# Patient Record
Sex: MALE | Race: WHITE | NOT HISPANIC OR LATINO | Employment: OTHER | URBAN - METROPOLITAN AREA
[De-identification: names, ages, dates, MRNs, and addresses within clinical notes are randomized per-mention and may not be internally consistent; named-entity substitution may affect disease eponyms.]

---

## 2017-08-04 ENCOUNTER — APPOINTMENT (OUTPATIENT)
Dept: RADIOLOGY | Facility: MEDICAL CENTER | Age: 62
End: 2017-08-04
Attending: EMERGENCY MEDICINE
Payer: MEDICARE

## 2017-08-04 ENCOUNTER — HOSPITAL ENCOUNTER (EMERGENCY)
Facility: MEDICAL CENTER | Age: 62
End: 2017-08-04
Attending: EMERGENCY MEDICINE
Payer: MEDICARE

## 2017-08-04 VITALS
HEART RATE: 75 BPM | DIASTOLIC BLOOD PRESSURE: 80 MMHG | RESPIRATION RATE: 17 BRPM | WEIGHT: 153 LBS | SYSTOLIC BLOOD PRESSURE: 110 MMHG | HEIGHT: 72 IN | OXYGEN SATURATION: 96 % | TEMPERATURE: 98.4 F | BODY MASS INDEX: 20.72 KG/M2

## 2017-08-04 DIAGNOSIS — S00.03XA SCALP CONTUSION: ICD-10-CM
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
## 2017-08-04 DIAGNOSIS — S09.90XA CHI (CLOSED HEAD INJURY), INITIAL ENCOUNTER: ICD-10-CM

## 2017-08-04 PROCEDURE — 99284 EMERGENCY DEPT VISIT MOD MDM: CPT

## 2017-08-04 PROCEDURE — 70450 CT HEAD/BRAIN W/O DYE: CPT

## 2017-08-04 RX ORDER — TAMSULOSIN HYDROCHLORIDE 0.4 MG/1
0.4 CAPSULE ORAL
COMMUNITY

## 2017-08-04 RX ORDER — ACETAMINOPHEN 160 MG
1 TABLET,DISINTEGRATING ORAL DAILY
COMMUNITY

## 2017-08-04 ASSESSMENT — PAIN SCALES - WONG BAKER
WONGBAKER_NUMERICALRESPONSE: HURTS JUST A LITTLE BIT

## 2017-08-04 NOTE — DISCHARGE INSTRUCTIONS
Fall Prevention in the Home   Falls can cause injuries. They can happen to people of all ages. There are many things you can do to make your home safe and to help prevent falls.   WHAT CAN I DO ON THE OUTSIDE OF MY HOME?  · Regularly fix the edges of walkways and driveways and fix any cracks.  · Remove anything that might make you trip as you walk through a door, such as a raised step or threshold.  · Trim any bushes or trees on the path to your home.  · Use bright outdoor lighting.  · Clear any walking paths of anything that might make someone trip, such as rocks or tools.  · Regularly check to see if handrails are loose or broken. Make sure that both sides of any steps have handrails.  · Any raised decks and porches should have guardrails on the edges.  · Have any leaves, snow, or ice cleared regularly.  · Use sand or salt on walking paths during winter.  · Clean up any spills in your garage right away. This includes oil or grease spills.  WHAT CAN I DO IN THE BATHROOM?   · Use night lights.  · Install grab bars by the toilet and in the tub and shower. Do not use towel bars as grab bars.  · Use non-skid mats or decals in the tub or shower.  · If you need to sit down in the shower, use a plastic, non-slip stool.  · Keep the floor dry. Clean up any water that spills on the floor as soon as it happens.  · Remove soap buildup in the tub or shower regularly.  · Attach bath mats securely with double-sided non-slip rug tape.  · Do not have throw rugs and other things on the floor that can make you trip.  WHAT CAN I DO IN THE BEDROOM?  · Use night lights.  · Make sure that you have a light by your bed that is easy to reach.  · Do not use any sheets or blankets that are too big for your bed. They should not hang down onto the floor.  · Have a firm chair that has side arms. You can use this for support while you get dressed.  · Do not have throw rugs and other things on the floor that can make you trip.  WHAT CAN I DO IN  THE KITCHEN?  · Clean up any spills right away.  · Avoid walking on wet floors.  · Keep items that you use a lot in easy-to-reach places.  · If you need to reach something above you, use a strong step stool that has a grab bar.  · Keep electrical cords out of the way.  · Do not use floor polish or wax that makes floors slippery. If you must use wax, use non-skid floor wax.  · Do not have throw rugs and other things on the floor that can make you trip.  WHAT CAN I DO WITH MY STAIRS?  · Do not leave any items on the stairs.  · Make sure that there are handrails on both sides of the stairs and use them. Fix handrails that are broken or loose. Make sure that handrails are as long as the stairways.  · Check any carpeting to make sure that it is firmly attached to the stairs. Fix any carpet that is loose or worn.  · Avoid having throw rugs at the top or bottom of the stairs. If you do have throw rugs, attach them to the floor with carpet tape.  · Make sure that you have a light switch at the top of the stairs and the bottom of the stairs. If you do not have them, ask someone to add them for you.  WHAT ELSE CAN I DO TO HELP PREVENT FALLS?  · Wear shoes that:  ¨ Do not have high heels.  ¨ Have rubber bottoms.  ¨ Are comfortable and fit you well.  ¨ Are closed at the toe. Do not wear sandals.  · If you use a stepladder:  ¨ Make sure that it is fully opened. Do not climb a closed stepladder.  ¨ Make sure that both sides of the stepladder are locked into place.  ¨ Ask someone to hold it for you, if possible.  · Clearly lucian and make sure that you can see:  ¨ Any grab bars or handrails.  ¨ First and last steps.  ¨ Where the edge of each step is.  · Use tools that help you move around (mobility aids) if they are needed. These include:  ¨ Canes.  ¨ Walkers.  ¨ Scooters.  ¨ Crutches.  · Turn on the lights when you go into a dark area. Replace any light bulbs as soon as they burn out.  · Set up your furniture so you have a clear  path. Avoid moving your furniture around.  · If any of your floors are uneven, fix them.  · If there are any pets around you, be aware of where they are.  · Review your medicines with your doctor. Some medicines can make you feel dizzy. This can increase your chance of falling.  Ask your doctor what other things that you can do to help prevent falls.     This information is not intended to replace advice given to you by your health care provider. Make sure you discuss any questions you have with your health care provider.     Document Released: 10/14/2010 Document Revised: 05/03/2016 Document Reviewed: 01/22/2016  [employer] Interactive Patient Education ©2016 [employer]    Head Injury, Adult  You have a head injury. Headaches and throwing up (vomiting) are common after a head injury. It should be easy to wake up from sleeping. Sometimes you must stay in the hospital. Most problems happen within the first 24 hours. Side effects may occur up to 7-10 days after the injury.   WHAT ARE THE TYPES OF HEAD INJURIES?  Head injuries can be as minor as a bump. Some head injuries can be more severe. More severe head injuries include:  · A jarring injury to the brain (concussion).  · A bruise of the brain (contusion). This mean there is bleeding in the brain that can cause swelling.  · A cracked skull (skull fracture).  · Bleeding in the brain that collects, clots, and forms a bump (hematoma).  WHEN SHOULD I GET HELP RIGHT AWAY?   · You are confused or sleepy.  · You cannot be woken up.  · You feel sick to your stomach (nauseous) or keep throwing up (vomiting).  · Your dizziness or unsteadiness is getting worse.  · You have very bad, lasting headaches that are not helped by medicine. Take medicines only as told by your doctor.  · You cannot use your arms or legs like normal.  · You cannot walk.  · You notice changes in the black spots in the center of the colored part of your eye (pupil).  · You have clear or bloody fluid  coming from your nose or ears.  · You have trouble seeing.  During the next 24 hours after the injury, you must stay with someone who can watch you. This person should get help right away (call 911 in the [place] if you start to shake and are not able to control it (have seizures), you pass out, or you are unable to wake up.  HOW CAN I PREVENT A HEAD INJURY IN THE FUTURE?  · Wear seat belts.  · Wear a helmet while bike riding and playing sports like football.  · Stay away from dangerous activities around the house.  WHEN CAN I RETURN TO NORMAL ACTIVITIES AND ATHLETICS?  See your doctor before doing these activities. You should not do normal activities or play contact sports until 1 week after the following symptoms have stopped:  · Headache that does not go away.  · Dizziness.  · Poor attention.  · Confusion.  · Memory problems.  · Sickness to your stomach or throwing up.  · Tiredness.  · Fussiness.  · Bothered by bright lights or loud noises.  · Anxiousness or depression.  · Restless sleep.  MAKE SURE YOU:   · Understand these instructions.  · Will watch your condition.  · Will get help right away if you are not doing well or get worse.     This information is not intended to replace advice given to you by your health care provider. Make sure you discuss any questions you have with your health care provider.     Document Released: 11/30/2009 Document Revised: 01/08/2016 Document Reviewed: 08/25/2014  [employer] Interactive Patient Education ©2016 [employer] Inc.

## 2017-08-04 NOTE — ED AVS SNAPSHOT
Home Care Instructions                                                                                                                Mauricio Santizo   MRN: [id number]    Department:  [facility], Emergency Dept   Date of Visit:  8/4/2017            [facility], Emergency Dept    [address] [facility] [address]    Phone:  [phone]      You were seen by     1. Darius Tiwari M.D.    2. Krystal Godwin M.D.      Your Diagnosis Was     Fall, initial encounter     W19.XXXA       Follow-up Information     1. Follow up with [facility], Emergency Dept.    Specialty:  Emergency Medicine    Why:  As needed, If symptoms worsen    Contact information    [address] [place] [id number] [phone]      Medication Information     Review all of your home medications and newly ordered medications with your primary doctor and/or pharmacist as soon as possible. Follow medication instructions as directed by your doctor and/or pharmacist.     Please keep your complete medication list with you and share with your physician. Update the information when medications are discontinued, doses are changed, or new medications (including over-the-counter products) are added; and carry medication information at all times in the event of emergency situations.               Medication List      ASK your doctor about these medications        Instructions    Morning Afternoon Evening Bedtime    tamsulosin 0.4 MG capsule   Commonly known as:  FLOMAX        Take 0.4 mg by mouth ONE-HALF HOUR AFTER BREAKFAST.   Dose:  0.4 mg                        Vitamin D3 2000 UNIT Caps        Take  by mouth.                                Procedures and tests performed during your visit     CT-HEAD W/O        Discharge Instructions       Fall Prevention in the Home   Falls can cause injuries. They can happen to people of all ages. There are many things you can do to make your home safe and  to help prevent falls.   WHAT CAN I DO ON THE OUTSIDE OF MY HOME?  · Regularly fix the edges of walkways and driveways and fix any cracks.  · Remove anything that might make you trip as you walk through a door, such as a raised step or threshold.  · Trim any bushes or trees on the path to your home.  · Use bright outdoor lighting.  · Clear any walking paths of anything that might make someone trip, such as rocks or tools.  · Regularly check to see if handrails are loose or broken. Make sure that both sides of any steps have handrails.  · Any raised decks and porches should have guardrails on the edges.  · Have any leaves, snow, or ice cleared regularly.  · Use sand or salt on walking paths during winter.  · Clean up any spills in your garage right away. This includes oil or grease spills.  WHAT CAN I DO IN THE BATHROOM?   · Use night lights.  · Install grab bars by the toilet and in the tub and shower. Do not use towel bars as grab bars.  · Use non-skid mats or decals in the tub or shower.  · If you need to sit down in the shower, use a plastic, non-slip stool.  · Keep the floor dry. Clean up any water that spills on the floor as soon as it happens.  · Remove soap buildup in the tub or shower regularly.  · Attach bath mats securely with double-sided non-slip rug tape.  · Do not have throw rugs and other things on the floor that can make you trip.  WHAT CAN I DO IN THE BEDROOM?  · Use night lights.  · Make sure that you have a light by your bed that is easy to reach.  · Do not use any sheets or blankets that are too big for your bed. They should not hang down onto the floor.  · Have a firm chair that has side arms. You can use this for support while you get dressed.  · Do not have throw rugs and other things on the floor that can make you trip.  WHAT CAN I DO IN THE KITCHEN?  · Clean up any spills right away.  · Avoid walking on wet floors.  · Keep items that you use a lot in easy-to-reach places.  · If you need to  reach something above you, use a strong step stool that has a grab bar.  · Keep electrical cords out of the way.  · Do not use floor polish or wax that makes floors slippery. If you must use wax, use non-skid floor wax.  · Do not have throw rugs and other things on the floor that can make you trip.  WHAT CAN I DO WITH MY STAIRS?  · Do not leave any items on the stairs.  · Make sure that there are handrails on both sides of the stairs and use them. Fix handrails that are broken or loose. Make sure that handrails are as long as the stairways.  · Check any carpeting to make sure that it is firmly attached to the stairs. Fix any carpet that is loose or worn.  · Avoid having throw rugs at the top or bottom of the stairs. If you do have throw rugs, attach them to the floor with carpet tape.  · Make sure that you have a light switch at the top of the stairs and the bottom of the stairs. If you do not have them, ask someone to add them for you.  WHAT ELSE CAN I DO TO HELP PREVENT FALLS?  · Wear shoes that:  ¨ Do not have high heels.  ¨ Have rubber bottoms.  ¨ Are comfortable and fit you well.  ¨ Are closed at the toe. Do not wear sandals.  · If you use a stepladder:  ¨ Make sure that it is fully opened. Do not climb a closed stepladder.  ¨ Make sure that both sides of the stepladder are locked into place.  ¨ Ask someone to hold it for you, if possible.  · Clearly lucian and make sure that you can see:  ¨ Any grab bars or handrails.  ¨ First and last steps.  ¨ Where the edge of each step is.  · Use tools that help you move around (mobility aids) if they are needed. These include:  ¨ Canes.  ¨ Walkers.  ¨ Scooters.  ¨ Crutches.  · Turn on the lights when you go into a dark area. Replace any light bulbs as soon as they burn out.  · Set up your furniture so you have a clear path. Avoid moving your furniture around.  · If any of your floors are uneven, fix them.  · If there are any pets around you, be aware of where they  are.  · Review your medicines with your doctor. Some medicines can make you feel dizzy. This can increase your chance of falling.  Ask your doctor what other things that you can do to help prevent falls.     This information is not intended to replace advice given to you by your health care provider. Make sure you discuss any questions you have with your health care provider.     Document Released: 10/14/2010 Document Revised: 05/03/2016 Document Reviewed: 01/22/2016  [employer] Interactive Patient Education ©2016 [employer] Inc.    Head Injury, Adult  You have a head injury. Headaches and throwing up (vomiting) are common after a head injury. It should be easy to wake up from sleeping. Sometimes you must stay in the hospital. Most problems happen within the first 24 hours. Side effects may occur up to 7-10 days after the injury.   WHAT ARE THE TYPES OF HEAD INJURIES?  Head injuries can be as minor as a bump. Some head injuries can be more severe. More severe head injuries include:  · A jarring injury to the brain (concussion).  · A bruise of the brain (contusion). This mean there is bleeding in the brain that can cause swelling.  · A cracked skull (skull fracture).  · Bleeding in the brain that collects, clots, and forms a bump (hematoma).  WHEN SHOULD I GET HELP RIGHT AWAY?   · You are confused or sleepy.  · You cannot be woken up.  · You feel sick to your stomach (nauseous) or keep throwing up (vomiting).  · Your dizziness or unsteadiness is getting worse.  · You have very bad, lasting headaches that are not helped by medicine. Take medicines only as told by your doctor.  · You cannot use your arms or legs like normal.  · You cannot walk.  · You notice changes in the black spots in the center of the colored part of your eye (pupil).  · You have clear or bloody fluid coming from your nose or ears.  · You have trouble seeing.  During the next 24 hours after the injury, you must stay with someone who can watch you. This  person should get help right away (call 911 in the [place] if you start to shake and are not able to control it (have seizures), you pass out, or you are unable to wake up.  HOW CAN I PREVENT A HEAD INJURY IN THE FUTURE?  · Wear seat belts.  · Wear a helmet while bike riding and playing sports like football.  · Stay away from dangerous activities around the house.  WHEN CAN I RETURN TO NORMAL ACTIVITIES AND ATHLETICS?  See your doctor before doing these activities. You should not do normal activities or play contact sports until 1 week after the following symptoms have stopped:  · Headache that does not go away.  · Dizziness.  · Poor attention.  · Confusion.  · Memory problems.  · Sickness to your stomach or throwing up.  · Tiredness.  · Fussiness.  · Bothered by bright lights or loud noises.  · Anxiousness or depression.  · Restless sleep.  MAKE SURE YOU:   · Understand these instructions.  · Will watch your condition.  · Will get help right away if you are not doing well or get worse.     This information is not intended to replace advice given to you by your health care provider. Make sure you discuss any questions you have with your health care provider.     Document Released: 11/30/2009 Document Revised: 01/08/2016 Document Reviewed: 08/25/2014  [employer] Interactive Patient Education ©2016 [employer]            Patient Information     Patient Information    Following emergency treatment: all patient requiring follow-up care must return either to a private physician or a clinic if your condition worsens before you are able to obtain further medical attention, please return to the emergency room.     Billing Information    At [facility], we work to make the billing process streamlined for our patients.  Our Representatives are here to answer any questions you may have regarding your hospital bill.  If you have insurance coverage and have supplied your insurance information to us, we will submit a claim to  your insurer on your behalf.  Should you have any questions regarding your bill, we can be reached online or by phone as follows:  Online: You are able pay your bills online or live chat with our representatives about any billing questions you may have. We are here to help Monday - Friday from 8:00am to 7:30pm and 9:00am - 12:00pm on Saturdays.  Please visit [email or web address]  for more information.   Phone:  [phone] or [phone]    Please note that your emergency physician, surgeon, pathologist, radiologist, anesthesiologist, and other specialists are not employed by [facility] and will therefore bill separately for their services.  Please contact them directly for any questions concerning their bills at the numbers below:     Emergency Physician Services:  [phone]  [facility]:  [phone]  [facility]:  [phone]  [facility]:  [phone]    1. Your final bill may vary from the amount quoted upon discharge if all procedures are not complete at that time, or if your doctor has additional procedures of which we are not aware. You will receive an additional bill if you return to the Emergency Department at [facility] for suture removal regardless of the facility of which the sutures were placed.     2. Please arrange for settlement of this account at the emergency registration.    3. All self-pay accounts are due in full at the time of treatment.  If you are unable to meet this obligation then payment is expected within 4-5 days.     4. If you have had radiology studies (CT, X-ray, Ultrasound, MRI), you have received a preliminary result during your emergency department visit. Please contact the radiology department [phone] to receive a copy of your final result. Please discuss the Final result with your primary physician or with the follow up physician provided.     Crisis Hotline:  National Crisis  Hotline:  8-342-PDBRXGF or 1-667.196.6516  [place] Crisis Hotline:    1-395.687.7313 or 660-924-8456         ED Discharge Follow Up Questions    1. In order to provide you with very good care, we would like to follow up with a phone call in the next few days.  May we have your permission to contact you?     YES /  NO    2. What is the best phone number to call you? (       )_____-__________    3. What is the best time to call you?      Morning  /  Afternoon  /  Evening                   Patient Signature:  ____________________________________________________________    Date:  ____________________________________________________________

## 2017-08-04 NOTE — ED PROVIDER NOTES
ED Provider Note    Scribed for Darius Tiwari M.D. by Justine Shearer. 8/4/2017  11:55 AM    Primary Care Provider: None  Means of arrival: Ambulance  History obtained from: Patient  History limited by: None    CHIEF COMPLAINT  Chief Complaint   Patient presents with   • Fall and Head Injury       HPI  Mauricio Santizo is a 62 y.o. male who presents to the ED by ambulance for a fall and a head injury with an onset of today. Patient is a resident at [facility] for a history of a traumatic brain injury. He experienced a ground level fall today while attempting to get out of his wheelchair. He struck the left side of his head on a nightstand. He presents with an abrasion to his left temporal. Xarelto was discontinued yesterday. Per nursing staff, mentation is at baseline. Negative for loss of consciousness, chest pain, shortness of breath, headache or vision changes.      REVIEW OF SYSTEMS  CONSTITUTIONAL:  Denies fever, chills, weight gain/loss or weakness.  EYES:  Denies vision changes.  ENT:  Denies sore throat, nose or ear pain.  CARDIOVASCULAR:  Denies chest pain, palpitations or swelling.  RESPIRATORY:  Denies cough, shortness of breath or difficulty breathing.  GI:  Denies abdominal pain, nausea, vomiting or diarrhea.  MUSCULOSKELETAL:  Positive ground level fall. Denies weakness, joint swelling or back pain.  SKIN:  Positive abrasion to left temporal. No rash or bruising.  NEUROLOGIC:  Positive head injury to left. Denies loss of consciousness, headache, focal weakness or numbness.  PSYCHIATRIC:  Denies depression.    See [facility] [place] for further details. C.      PAST MEDICAL HISTORY  Past Medical History   Diagnosis Date   • Failure to thrive (0-17)    • Seizure disorder (CMS-[facility])    • Dementia    • High cholesterol    • TBI (traumatic brain injury) (CMS-[facility])    • DVT (deep venous thrombosis) (CMS-[facility])    • BPH (benign prostatic hyperplasia)    • Constipation        FAMILY HISTORY  History reviewed. No pertinent family  "history.      SOCIAL HISTORY  Patient reports that he has never smoked.  He reports that he does not drink alcohol or use illicit drugs.      SURGICAL HISTORY  History reviewed. No pertinent past surgical history.      CURRENT MEDICATIONS  Xarelto was discontinued yesterday.  No current facility-administered medications for this encounter.    Current outpatient prescriptions:   •  tamsulosin (FLOMAX) 0.4 MG capsule, Take 0.4 mg by mouth ONE-HALF HOUR AFTER BREAKFAST., Disp: , Rfl:   •  Cholecalciferol (VITAMIN D3) 2000 UNIT Cap, Take  by mouth., Disp: , Rfl:       ALLERGIES  Allergies   Allergen Reactions   • Depakote [Valproic Acid]    • Heparin    • Phenytoin        PHYSICAL EXAM  VITAL SIGNS: /80 mmHg  Pulse 79  Temp(Src) 36.9 °C (98.4 °F)  Resp 16  Ht 1.829 m (6' 0.01\")  Wt 69.4 kg (153 lb)  BMI 20.75 kg/m2       Constitutional: Patient is awake and alert. No acute respiratory distress.elderly male.   HENT: Normocephalic, Abrasion to left frontal temporal area, No prabhakar sign or racoon eyes, Bilateral external ears normal, Oropharynx pink moist with no exudates, Nose patent.  Eyes: Sclera and conjunctiva clear, No discharge.   Neck:  Supple no nuchal rigidity, no thyromegaly or mass. Non-tender  Lymphatic: No supraclavicular lymph nodes.   Cardiovascular: Heart is regular rate and rhythm no murmur, rub or thrill.   Thorax & Lungs: Chest is symmetrical, with good breath sounds. No wheezing or crackles. No respiratory distress, No chest tenderness.   Abdomen: Soft, No tenderness no hepatosplenomegaly there is no guarding or rebound, No masses, No pulsatile masses. Bowel sounds are present.  Skin: Warm, Dry, no petechia, purpura, or rash.   Back: Non tender with palpation, No CVA tenderness.   Extremities: Thin extremities, No edema. Non tender.   Musculoskeletal: Able to move arms symmetricallybut are weak, Pulses 2+ radially and femorally. No gross deformities noted.   Neurologic: Alert.  No movement " to legs. Able to move arms symmetricallybut are weak, Sensory is intact to light touch to face, arms, and legs.  DTRs are symmetrical in biceps brachioradialis, patella and Achilles.   Psychiatric: Normal affect.          RADIOLOGY/PROCEDURES  CT-HEAD W/O    (Results Pending)     The radiologist's interpretations of all radiological studies have been reviewed by me.       COURSE & MEDICAL DECISION MAKING  Pertinent Labs & Imaging studies reviewed. (See chart for details)    Differential Diagnosis include but are not limited to: intracranial hemorrhage, contusion, stroke.    11:56 AM Patient seen and examined at bedside. Patient presents for a head injury. Initial orders in the Emergency Department included CT head.     1440. Patient still waiting for his CAT scan.            Decision Making:  Patient who had a fall today bumped his left head. He has an abrasion there. However CAT scan on his head. He is still not been done due the only trauma that is come to earlier. He is currently not on any blood thinners per the nursing home. On recheck he still wide awake. If his CAT scan is normal will transfer him back to the care facility.    We'll turn the patient over to my partner for follow-up on the CAT scan.    DISPOSITION  Patient will be discharged home in stable condition.      FOLLOW UP  I  care doctor or the care facility    The patient is referred to a primary physician for blood pressure management, diabetic screening, and for all other preventative health concerns.      OUTPATIENT MEDICATIONS  New Prescriptions    No medications on file       DIAGNOSIS  1. Fall  2. Left forehead contusion       PLAN  1. CAT scan negative will go back to the rehab hospital  2. Return to the emergency department for increased pains, fevers, vomiting or change in condition.        The note accurately reflects work and decisions made by me.  Darius Tiwari  8/4/2017  2:41 PM     IJustine (Scribe), am scribing for,  and in the presence of, Darius Tiwari M.D.    Electronically signed by: Justine Shearer (Scribe), 8/4/2017    I, Darius Tiwari M.D. personally performed the services described in this documentation, as scribed by Justine Shearer in my presence, and it is both accurate and complete.

## 2017-08-04 NOTE — ED AVS SNAPSHOT
8/4/2017    Mauricio Santizo  [address]  [place] [address]    Dear Mauricio:    [facility] wants to ensure your discharge home is safe and you or your loved ones have had all of your questions answered regarding your care after you leave the hospital.    Below is a list of resources and contact information should you have any questions regarding your hospital stay, follow-up instructions, or active medical symptoms.    Questions or Concerns Regarding… Contact   Medical Questions Related to Your Discharge  (7 days a week, 8am-5pm) Contact a Nurse Care Coordinator   [phone]   Medical Questions Not Related to Your Discharge  (24 hours a day / 7 days a week)  Contact the Nurse Health Line   [phone]    Medications or Discharge Instructions Refer to your discharge packet   or contact your [facility] Primary Care Provider   [phone]   Follow-up Appointment(s) Schedule your appointment via Blurb   or contact Scheduling [phone]   Billing Review your statement via Blurb  or contact Billing [phone]   Medical Records Review your records via Blurb   or contact Medical Records [phone]     You may receive a telephone call within two days of discharge. This call is to make certain you understand your discharge instructions and have the opportunity to have any questions answered. You can also easily access your medical information, test results and upcoming appointments via the Blurb free online health management tool. You can learn more and sign up at [email or web address]. For assistance setting up your Blurb account, please call [phone].    Once again, we want to ensure your discharge home is safe and that you have a clear understanding of any next steps in your care. If you have any questions or concerns, please do not hesitate to contact us, we are here for you. Thank you for choosing [facility] for your healthcare needs.    Sincerely,    Your [facility] Healthcare Team

## 2017-08-04 NOTE — DISCHARGE PLANNING
Medical Social Work     SW received a call from [facility] and transportation is set up for 1730. Transportation packet and Cobra complete and placed with hard chart. Pt, RN and family notified.

## 2017-08-04 NOTE — ED PROVIDER NOTES
ED PROVIDER NOTE    Scribed for Krystal Godwin M.D. by Nelly Trejo. 8/4/2017, 3:33 PM.    This is an addendum to the note on Mauricio Santizo. For further details and full chart entry, see the previously signed ED Provider Note written by Dr. Tiwari ([facility]).      3:33 PM The patient's CT head was reviewed and found to be negative. Patient reevaluated at bedside and informed of CT head results. Discussed arrangement to go back to [facility]. He is agreeable.       Nelly MALCOLM (Scribselam), am scribing for, and in the presence of, Krystal Godwin M.D..    Electronically signed by: Nelly Trejo ([place]), 8/4/2017    Krystal MALCOLM M.D. personally performed the services described in this documentation, as scribed by Nelly Trejo in my presence, and it is both accurate and complete.    The note accurately reflects work and decisions made by me.  Krystal Godwin  8/4/2017  8:43 PM

## 2017-08-04 NOTE — DISCHARGE PLANNING
Arranged patient's transport back to [facility] at 1730 veia [facility].  Tiera() and Casey([facility]) notified.

## 2017-08-04 NOTE — ED NOTES
Pt arrived via JAYA, resident at [facility] for hx of TBI and failure to thrive. Per EMS pt has had 3 falls within the past week with last one occuring today. EMS states pt has been attempting to get out of wheelchair recently and per staff at [facility] he possibly fell out of wheelchair today hitting L side of head on nightstand. Pt was d/c from Xarelto yesterday. Pt has abrasion and swelling noted to L side of head. No open wound. Pt mentation is at baseline according to EMS, alert to self and place only. Pt also has uneven pupils d/t prior TBI. Pt follows commands and answers most questions appropriately. .

## 2017-08-04 NOTE — ED NOTES
Pt states he had a BM. Pt changed, small amount of hard stool noted in brief. Pt changed into new brief. No skin breakdown noted.

## 2017-08-04 NOTE — DISCHARGE PLANNING
Medical Social Work     Referral: Transportation to SNF    MICHOACANO received a call from beside RN requesting assistance with transportation to SNF. Pt is medically clear to go back to [facility].  MICHOACANO faxed over a [facility] form to the [facility].

## 2017-08-05 NOTE — ED NOTES
MD at bedside prior to d/c. Pt given d/c instruction. Unable to sign. No rx's given. Pt taken back to [facility] via [facility]. Pt took all belongings from room.

## 2018-02-16 ENCOUNTER — RESOLUTE PROFESSIONAL BILLING HOSPITAL PROF FEE (OUTPATIENT)
Dept: HOSPITALIST | Facility: MEDICAL CENTER | Age: 63
End: 2018-02-16
Payer: MEDICARE

## 2018-02-16 ENCOUNTER — APPOINTMENT (OUTPATIENT)
Dept: RADIOLOGY | Facility: MEDICAL CENTER | Age: 63
DRG: 469 | End: 2018-02-16
Attending: ORTHOPAEDIC SURGERY
Payer: MEDICARE

## 2018-02-16 ENCOUNTER — APPOINTMENT (OUTPATIENT)
Dept: RADIOLOGY | Facility: MEDICAL CENTER | Age: 63
DRG: 469 | End: 2018-02-16
Attending: EMERGENCY MEDICINE
Payer: MEDICARE

## 2018-02-16 ENCOUNTER — HOSPITAL ENCOUNTER (INPATIENT)
Facility: MEDICAL CENTER | Age: 63
LOS: 8 days | DRG: 469 | End: 2018-02-24
Attending: EMERGENCY MEDICINE | Admitting: INTERNAL MEDICINE
Payer: MEDICARE

## 2018-02-16 DIAGNOSIS — S72.002A CLOSED FRACTURE OF NECK OF LEFT FEMUR, INITIAL ENCOUNTER (HCC): ICD-10-CM

## 2018-02-16 DIAGNOSIS — S72.002A CLOSED FRACTURE OF LEFT HIP, INITIAL ENCOUNTER (HCC): ICD-10-CM

## 2018-02-16 PROBLEM — S72.009A FEMORAL NECK FRACTURE (HCC): Status: ACTIVE | Noted: 2018-02-16

## 2018-02-16 LAB
25(OH)D3 SERPL-MCNC: 21 NG/ML (ref 30–100)
ALBUMIN SERPL BCP-MCNC: 4 G/DL (ref 3.2–4.9)
ALBUMIN/GLOB SERPL: 1.2 G/DL
ALP SERPL-CCNC: 106 U/L (ref 30–99)
ALT SERPL-CCNC: 13 U/L (ref 2–50)
ANION GAP SERPL CALC-SCNC: 11 MMOL/L (ref 0–11.9)
APTT PPP: 37.6 SEC (ref 24.7–36)
AST SERPL-CCNC: 17 U/L (ref 12–45)
BASOPHILS # BLD AUTO: 0.9 % (ref 0–1.8)
BASOPHILS # BLD: 0.09 K/UL (ref 0–0.12)
BILIRUB SERPL-MCNC: 0.7 MG/DL (ref 0.1–1.5)
BUN SERPL-MCNC: 20 MG/DL (ref 8–22)
CALCIUM SERPL-MCNC: 10 MG/DL (ref 8.5–10.5)
CHLORIDE SERPL-SCNC: 97 MMOL/L (ref 96–112)
CO2 SERPL-SCNC: 21 MMOL/L (ref 20–33)
CREAT SERPL-MCNC: 0.61 MG/DL (ref 0.5–1.4)
EKG IMPRESSION: NORMAL
EOSINOPHIL # BLD AUTO: 0.01 K/UL (ref 0–0.51)
EOSINOPHIL NFR BLD: 0.1 % (ref 0–6.9)
ERYTHROCYTE [DISTWIDTH] IN BLOOD BY AUTOMATED COUNT: 55.9 FL (ref 35.9–50)
GLOBULIN SER CALC-MCNC: 3.4 G/DL (ref 1.9–3.5)
GLUCOSE SERPL-MCNC: 109 MG/DL (ref 65–99)
HCT VFR BLD AUTO: 47.9 % (ref 42–52)
HGB BLD-MCNC: 15 G/DL (ref 14–18)
IMM GRANULOCYTES # BLD AUTO: 0.05 K/UL (ref 0–0.11)
IMM GRANULOCYTES NFR BLD AUTO: 0.5 % (ref 0–0.9)
INR PPP: 1.24 (ref 0.87–1.13)
LYMPHOCYTES # BLD AUTO: 0.68 K/UL (ref 1–4.8)
LYMPHOCYTES NFR BLD: 6.8 % (ref 22–41)
MCH RBC QN AUTO: 30.8 PG (ref 27–33)
MCHC RBC AUTO-ENTMCNC: 31.3 G/DL (ref 33.7–35.3)
MCV RBC AUTO: 98.4 FL (ref 81.4–97.8)
MONOCYTES # BLD AUTO: 0.53 K/UL (ref 0–0.85)
MONOCYTES NFR BLD AUTO: 5.3 % (ref 0–13.4)
NEUTROPHILS # BLD AUTO: 8.69 K/UL (ref 1.82–7.42)
NEUTROPHILS NFR BLD: 86.4 % (ref 44–72)
NRBC # BLD AUTO: 0 K/UL
NRBC BLD-RTO: 0 /100 WBC
PLATELET # BLD AUTO: 238 K/UL (ref 164–446)
PMV BLD AUTO: 10.1 FL (ref 9–12.9)
POTASSIUM SERPL-SCNC: 3.9 MMOL/L (ref 3.6–5.5)
PROCALCITONIN SERPL-MCNC: 0.12 NG/ML
PROT SERPL-MCNC: 7.4 G/DL (ref 6–8.2)
PROTHROMBIN TIME: 15.3 SEC (ref 12–14.6)
RBC # BLD AUTO: 4.87 M/UL (ref 4.7–6.1)
SODIUM SERPL-SCNC: 129 MMOL/L (ref 135–145)
WBC # BLD AUTO: 10.1 K/UL (ref 4.8–10.8)

## 2018-02-16 PROCEDURE — 71045 X-RAY EXAM CHEST 1 VIEW: CPT

## 2018-02-16 PROCEDURE — 73552 X-RAY EXAM OF FEMUR 2/>: CPT | Mod: LT

## 2018-02-16 PROCEDURE — A9270 NON-COVERED ITEM OR SERVICE: HCPCS | Performed by: INTERNAL MEDICINE

## 2018-02-16 PROCEDURE — 99285 EMERGENCY DEPT VISIT HI MDM: CPT

## 2018-02-16 PROCEDURE — 73501 X-RAY EXAM HIP UNI 1 VIEW: CPT | Mod: LT

## 2018-02-16 PROCEDURE — 94760 N-INVAS EAR/PLS OXIMETRY 1: CPT

## 2018-02-16 PROCEDURE — 85730 THROMBOPLASTIN TIME PARTIAL: CPT

## 2018-02-16 PROCEDURE — 84145 PROCALCITONIN (PCT): CPT

## 2018-02-16 PROCEDURE — 93005 ELECTROCARDIOGRAM TRACING: CPT | Performed by: EMERGENCY MEDICINE

## 2018-02-16 PROCEDURE — 85610 PROTHROMBIN TIME: CPT

## 2018-02-16 PROCEDURE — 85025 COMPLETE CBC W/AUTO DIFF WBC: CPT

## 2018-02-16 PROCEDURE — 82306 VITAMIN D 25 HYDROXY: CPT

## 2018-02-16 PROCEDURE — 700111 HCHG RX REV CODE 636 W/ 250 OVERRIDE (IP): Performed by: EMERGENCY MEDICINE

## 2018-02-16 PROCEDURE — 770006 HCHG ROOM/CARE - MED/SURG/GYN SEMI*

## 2018-02-16 PROCEDURE — 80053 COMPREHEN METABOLIC PANEL: CPT

## 2018-02-16 PROCEDURE — A4357 BEDSIDE DRAINAGE BAG: HCPCS | Performed by: INTERNAL MEDICINE

## 2018-02-16 PROCEDURE — 96374 THER/PROPH/DIAG INJ IV PUSH: CPT

## 2018-02-16 PROCEDURE — 700102 HCHG RX REV CODE 250 W/ 637 OVERRIDE(OP): Performed by: INTERNAL MEDICINE

## 2018-02-16 PROCEDURE — 99223 1ST HOSP IP/OBS HIGH 75: CPT | Mod: AI | Performed by: INTERNAL MEDICINE

## 2018-02-16 PROCEDURE — 700105 HCHG RX REV CODE 258: Performed by: INTERNAL MEDICINE

## 2018-02-16 RX ORDER — LEVETIRACETAM 1000 MG/1
TABLET ORAL 3 TIMES DAILY
Status: DISCONTINUED | OUTPATIENT
Start: 2018-02-16 | End: 2018-02-16

## 2018-02-16 RX ORDER — BACLOFEN 10 MG/1
5 TABLET ORAL 2 TIMES DAILY
Status: DISCONTINUED | OUTPATIENT
Start: 2018-02-16 | End: 2018-02-24 | Stop reason: HOSPADM

## 2018-02-16 RX ORDER — ATORVASTATIN CALCIUM 10 MG/1
10 TABLET, FILM COATED ORAL NIGHTLY
Status: DISCONTINUED | OUTPATIENT
Start: 2018-02-16 | End: 2018-02-24 | Stop reason: HOSPADM

## 2018-02-16 RX ORDER — BISACODYL 10 MG
10 SUPPOSITORY, RECTAL RECTAL
Status: DISCONTINUED | OUTPATIENT
Start: 2018-02-16 | End: 2018-02-24 | Stop reason: HOSPADM

## 2018-02-16 RX ORDER — OMEPRAZOLE 20 MG/1
20 CAPSULE, DELAYED RELEASE ORAL DAILY
COMMUNITY

## 2018-02-16 RX ORDER — MORPHINE SULFATE 4 MG/ML
2 INJECTION, SOLUTION INTRAMUSCULAR; INTRAVENOUS
Status: DISCONTINUED | OUTPATIENT
Start: 2018-02-16 | End: 2018-02-18

## 2018-02-16 RX ORDER — PROMETHAZINE HYDROCHLORIDE 25 MG/1
12.5-25 TABLET ORAL EVERY 4 HOURS PRN
Status: DISCONTINUED | OUTPATIENT
Start: 2018-02-16 | End: 2018-02-24 | Stop reason: HOSPADM

## 2018-02-16 RX ORDER — CHOLECALCIFEROL (VITAMIN D3) 125 MCG
500 CAPSULE ORAL DAILY
COMMUNITY

## 2018-02-16 RX ORDER — CHOLECALCIFEROL (VITAMIN D3) 125 MCG
500 CAPSULE ORAL DAILY
Status: DISCONTINUED | OUTPATIENT
Start: 2018-02-17 | End: 2018-02-24 | Stop reason: HOSPADM

## 2018-02-16 RX ORDER — SODIUM CHLORIDE 9 MG/ML
INJECTION, SOLUTION INTRAVENOUS CONTINUOUS
Status: DISCONTINUED | OUTPATIENT
Start: 2018-02-16 | End: 2018-02-21

## 2018-02-16 RX ORDER — BACLOFEN 10 MG/1
5 TABLET ORAL 2 TIMES DAILY
COMMUNITY

## 2018-02-16 RX ORDER — LEVETIRACETAM 1000 MG/1
TABLET ORAL 3 TIMES DAILY
Status: ON HOLD | COMMUNITY
End: 2018-02-24

## 2018-02-16 RX ORDER — ATORVASTATIN CALCIUM 10 MG/1
10 TABLET, FILM COATED ORAL NIGHTLY
COMMUNITY

## 2018-02-16 RX ORDER — MICONAZOLE NITRATE 20 MG/G
CREAM TOPICAL EVERY 6 HOURS PRN
Status: DISCONTINUED | OUTPATIENT
Start: 2018-02-16 | End: 2018-02-24 | Stop reason: HOSPADM

## 2018-02-16 RX ORDER — OXYCODONE HYDROCHLORIDE 5 MG/1
2.5 TABLET ORAL
Status: DISCONTINUED | OUTPATIENT
Start: 2018-02-16 | End: 2018-02-18

## 2018-02-16 RX ORDER — ONDANSETRON 2 MG/ML
4 INJECTION INTRAMUSCULAR; INTRAVENOUS EVERY 4 HOURS PRN
Status: DISCONTINUED | OUTPATIENT
Start: 2018-02-16 | End: 2018-02-24 | Stop reason: HOSPADM

## 2018-02-16 RX ORDER — AMOXICILLIN 250 MG
2 CAPSULE ORAL 2 TIMES DAILY
Status: DISCONTINUED | OUTPATIENT
Start: 2018-02-16 | End: 2018-02-24 | Stop reason: HOSPADM

## 2018-02-16 RX ORDER — LEVETIRACETAM 500 MG/1
1000 TABLET ORAL 2 TIMES DAILY
Status: DISCONTINUED | OUTPATIENT
Start: 2018-02-16 | End: 2018-02-21

## 2018-02-16 RX ORDER — ACETAMINOPHEN 325 MG/1
650 TABLET ORAL EVERY 6 HOURS PRN
Status: DISCONTINUED | OUTPATIENT
Start: 2018-02-16 | End: 2018-02-18

## 2018-02-16 RX ORDER — TAMSULOSIN HYDROCHLORIDE 0.4 MG/1
0.4 CAPSULE ORAL
Status: DISCONTINUED | OUTPATIENT
Start: 2018-02-16 | End: 2018-02-24 | Stop reason: HOSPADM

## 2018-02-16 RX ORDER — OXYCODONE HYDROCHLORIDE 5 MG/1
5 TABLET ORAL
Status: DISCONTINUED | OUTPATIENT
Start: 2018-02-16 | End: 2018-02-18

## 2018-02-16 RX ORDER — POLYETHYLENE GLYCOL 3350 17 G/17G
1 POWDER, FOR SOLUTION ORAL
Status: DISCONTINUED | OUTPATIENT
Start: 2018-02-16 | End: 2018-02-24 | Stop reason: HOSPADM

## 2018-02-16 RX ORDER — CALCIUM CARBONATE 500(1250)
500 TABLET ORAL 2 TIMES DAILY WITH MEALS
Status: DISCONTINUED | OUTPATIENT
Start: 2018-02-16 | End: 2018-02-24 | Stop reason: HOSPADM

## 2018-02-16 RX ORDER — LEVETIRACETAM 500 MG/1
2000 TABLET ORAL EVERY MORNING
Status: DISCONTINUED | OUTPATIENT
Start: 2018-02-17 | End: 2018-02-21

## 2018-02-16 RX ORDER — ONDANSETRON 4 MG/1
4 TABLET, ORALLY DISINTEGRATING ORAL EVERY 4 HOURS PRN
Status: DISCONTINUED | OUTPATIENT
Start: 2018-02-16 | End: 2018-02-24 | Stop reason: HOSPADM

## 2018-02-16 RX ORDER — PROMETHAZINE HYDROCHLORIDE 12.5 MG/1
12.5-25 SUPPOSITORY RECTAL EVERY 4 HOURS PRN
Status: DISCONTINUED | OUTPATIENT
Start: 2018-02-16 | End: 2018-02-24 | Stop reason: HOSPADM

## 2018-02-16 RX ORDER — LABETALOL HYDROCHLORIDE 5 MG/ML
10 INJECTION, SOLUTION INTRAVENOUS EVERY 4 HOURS PRN
Status: DISCONTINUED | OUTPATIENT
Start: 2018-02-16 | End: 2018-02-24 | Stop reason: HOSPADM

## 2018-02-16 RX ORDER — OMEPRAZOLE 20 MG/1
20 CAPSULE, DELAYED RELEASE ORAL DAILY
Status: DISCONTINUED | OUTPATIENT
Start: 2018-02-17 | End: 2018-02-24 | Stop reason: HOSPADM

## 2018-02-16 RX ADMIN — ATORVASTATIN CALCIUM 10 MG: 10 TABLET ORAL at 19:40

## 2018-02-16 RX ADMIN — LEVETIRACETAM 1000 MG: 500 TABLET, FILM COATED ORAL at 17:56

## 2018-02-16 RX ADMIN — STANDARDIZED SENNA CONCENTRATE AND DOCUSATE SODIUM 2 TABLET: 8.6; 5 TABLET, FILM COATED ORAL at 19:40

## 2018-02-16 RX ADMIN — BACLOFEN 5 MG: 10 TABLET ORAL at 19:39

## 2018-02-16 RX ADMIN — OXYCODONE HYDROCHLORIDE 5 MG: 5 TABLET ORAL at 17:56

## 2018-02-16 RX ADMIN — SODIUM CHLORIDE: 9 INJECTION, SOLUTION INTRAVENOUS at 17:23

## 2018-02-16 RX ADMIN — FENTANYL CITRATE 100 MCG: 50 INJECTION INTRAMUSCULAR; INTRAVENOUS at 12:09

## 2018-02-16 RX ADMIN — ACETAMINOPHEN 650 MG: 325 TABLET, FILM COATED ORAL at 19:38

## 2018-02-16 ASSESSMENT — PAIN SCALES - GENERAL
PAINLEVEL_OUTOF10: 6
PAINLEVEL_OUTOF10: 6
PAINLEVEL_OUTOF10: 8

## 2018-02-16 ASSESSMENT — COPD QUESTIONNAIRES
DO YOU EVER COUGH UP ANY MUCUS OR PHLEGM?: NO/ONLY WITH OCCASIONAL COLDS OR INFECTIONS
COPD SCREENING SCORE: 2
DURING THE PAST 4 WEEKS HOW MUCH DID YOU FEEL SHORT OF BREATH: NONE/LITTLE OF THE TIME
HAVE YOU SMOKED AT LEAST 100 CIGARETTES IN YOUR ENTIRE LIFE: NO/DON'T KNOW

## 2018-02-16 ASSESSMENT — LIFESTYLE VARIABLES
EVER_SMOKED: YES
EVER_SMOKED: UNABLE TO EVALUATE AT THIS TIME - NEEDS ASSESSMENT PRIOR TO DISCHARGE
ALCOHOL_USE: NO

## 2018-02-16 NOTE — ED NOTES
Med rec complete per Mar/list from [facility].   Allergies per [facility]

## 2018-02-16 NOTE — ED TRIAGE NOTES
Pt bib JAYA from [facility].  Pt fell out of bed at approx 4am this morning.  Staff was getting pt out of bed around 10am and pt began to complain of left hip pain.  Pt is bed bound.  REMSA found pt 88% on ra and placed on 2l o2.  Changed into gown and chart up for erp.

## 2018-02-16 NOTE — ED PROVIDER NOTES
"ED Provider Note    CHIEF COMPLAINT  Chief Complaint   Patient presents with   • Fall     out of bed   • Hip Pain     left       HPI  Mauricio Santizo is a 62 y.o. male who presents to the emergency department with left hip discomfort. The patient fell out of bed at approximately 4 AM. Since that time he did complain of left hip pain is been unable to ambulate. He does stay at [facility] after he suffered a traumatic brain injury. He states he only injured his left hip. Denies striking his head and his chest. He does not have any neck or back pain.    REVIEW OF SYSTEMS  See \[facility] [place] [facility]\"" for further details. All other systems are negative.     PAST MEDICAL HISTORY  Past Medical History:   Diagnosis Date   • BPH (benign prostatic hyperplasia)    • Constipation    • Dementia    • DVT (deep venous thrombosis) (CMS-HCC)    • Failure to thrive (0-17)    • High cholesterol    • Seizure disorder (CMS-HCC)    • TBI (traumatic brain injury) (CMS-HCC)        SOCIAL HISTORY  Social History     Social History   • Marital status: Single     Spouse name: N/A   • Number of children: N/A   • Years of education: N/A     Social History Main Topics   • Smoking status: Never Smoker   • Smokeless tobacco: Not on file   • Alcohol use No   • Drug use: No   • Sexual activity: Not on file     Other Topics Concern   • Not on file     Social History Narrative   • No narrative on file           PHYSICAL EXAM  VITAL SIGNS: /80   Pulse 85   Temp 36.3 °C (97.3 °F)   Resp 18   Ht 1.88 m (6' 2\")   Wt 69.4 kg (153 lb)   SpO2 94%   BMI 19.64 kg/m²   Constitutional: Chronically ill in appearance and in mild distress  HENT: Normocephalic, Atraumatic, tympanic membranes are intact and nonerythematous bilaterally, Oropharynx dry without exudates or erythema, Nose normal.   Eyes: PERRLA, EOMI, Conjunctiva normal.  Neck: Supple without meningismus  Lymphatic: No lymphadenopathy noted.   Cardiovascular: Normal heart rate, Normal rhythm, No murmurs, No " rubs, No gallops.   Thorax & Lungs: Normal breath sounds, No respiratory distress, No wheezing, No chest tenderness.   Abdomen: Bowel sounds normal, Soft, No tenderness, no rebound, no guarding, no distention, No masses, No pulsatile masses.   Skin: Warm, Dry, No erythema, No rash.   Back: No tenderness, No CVA tenderness.    Extremities: Left leg is shortened and externally rotated. He does have significant pain with any attempted internal/external rotation of the left lower extremity. Extremities otherwise Atraumatic with symmetric distal pulses, No edema, No tenderness, No cyanosis, No clubbing. However the patient does have significant contractures throughout  Neurologic: Alert & oriented x 3, cranial nerves II through XII are intact, no obvious neurologic deficits Psychiatric: Affect normal, Judgment normal, Mood normal.     EKG  12-lead EKG interpreted by myself shows a normal sinus rhythm with a ventricular rate 89, intervals show first degree AV block, normal QRS, no ST segment elevation or depression, normal T waves.    RADIOLOGY/PROCEDURES  DX-CHEST-PORTABLE (1 VIEW)   Final Result      Mild patchy groundglass opacity at the left lung base likely represents atelectasis. Pneumonitis not excluded.      Mild cardiomegaly.      Linear lucency at the right lung apex likely represents a skinfold. Follow-up plain film in one hour is recommended to exclude a small pneumothorax which is thought to be unlikely.      Findings discussed with Dr. Cates at 1:40 PM.               DX-HIP-UNILATERAL-WITH PELVIS-1 VIEW LEFT   Final Result      Bilateral femoral neck fractures.      Demineralization.      Deformity of the superior pubic ramus on the right may be chronic.      Degenerative changes in the visualized lower lumbar spine.      Moderate amount of colonic stool.            COURSE & MEDICAL DECISION MAKING  Pertinent Labs & Imaging studies reviewed. (See chart for details)  This a 62-year-old gentleman who  presents after a fall. Clinically he does have a left hip fracture in there. Preoperative workup was ordered. I did speak with the radiologist regarding the chest x-ray and we'll repeat a chest x-ray in approximately an hour to 2 hours to make sure there is no evidence of a pneumothorax which I suspected be unlikely as he does not have any chest pain or difficulty breathing. Furthermore he does not have any asymmetry in air movement. He does have bilateral femoral neck fractures however he only has pain with internal/external rotation of the left lower extremity. Therefore do not suspect the right hip fractures acute. The patient will be admitted to the hospitalist with orthopedics in consultation.    FINAL IMPRESSION  1. Right hip fracture     Disposition  The patient will be admitted in stable condition    Electronically signed by: Chris Cates, 2/16/2018 11:43 AM

## 2018-02-17 PROBLEM — R09.02 HYPOXIA: Status: ACTIVE | Noted: 2018-02-17

## 2018-02-17 PROBLEM — Z86.718 HISTORY OF DVT (DEEP VEIN THROMBOSIS): Status: ACTIVE | Noted: 2018-02-17

## 2018-02-17 PROBLEM — E87.1 HYPONATREMIA: Status: ACTIVE | Noted: 2018-02-17

## 2018-02-17 PROBLEM — Z87.820 H/O TRAUMATIC BRAIN INJURY: Status: ACTIVE | Noted: 2018-02-17

## 2018-02-17 PROBLEM — N40.0 BPH (BENIGN PROSTATIC HYPERPLASIA): Status: ACTIVE | Noted: 2018-02-17

## 2018-02-17 LAB
ANION GAP SERPL CALC-SCNC: 7 MMOL/L (ref 0–11.9)
BASOPHILS # BLD AUTO: 0.4 % (ref 0–1.8)
BASOPHILS # BLD: 0.03 K/UL (ref 0–0.12)
BUN SERPL-MCNC: 22 MG/DL (ref 8–22)
CALCIUM SERPL-MCNC: 9.2 MG/DL (ref 8.5–10.5)
CHLORIDE SERPL-SCNC: 101 MMOL/L (ref 96–112)
CO2 SERPL-SCNC: 23 MMOL/L (ref 20–33)
CREAT SERPL-MCNC: 0.74 MG/DL (ref 0.5–1.4)
EOSINOPHIL # BLD AUTO: 0.13 K/UL (ref 0–0.51)
EOSINOPHIL NFR BLD: 1.8 % (ref 0–6.9)
ERYTHROCYTE [DISTWIDTH] IN BLOOD BY AUTOMATED COUNT: 49.6 FL (ref 35.9–50)
GLUCOSE SERPL-MCNC: 75 MG/DL (ref 65–99)
HCT VFR BLD AUTO: 38.7 % (ref 42–52)
HGB BLD-MCNC: 13.1 G/DL (ref 14–18)
IMM GRANULOCYTES # BLD AUTO: 0.02 K/UL (ref 0–0.11)
IMM GRANULOCYTES NFR BLD AUTO: 0.3 % (ref 0–0.9)
LYMPHOCYTES # BLD AUTO: 1.09 K/UL (ref 1–4.8)
LYMPHOCYTES NFR BLD: 15.5 % (ref 22–41)
MCH RBC QN AUTO: 30.9 PG (ref 27–33)
MCHC RBC AUTO-ENTMCNC: 33.9 G/DL (ref 33.7–35.3)
MCV RBC AUTO: 91.3 FL (ref 81.4–97.8)
MONOCYTES # BLD AUTO: 0.58 K/UL (ref 0–0.85)
MONOCYTES NFR BLD AUTO: 8.2 % (ref 0–13.4)
NEUTROPHILS # BLD AUTO: 5.19 K/UL (ref 1.82–7.42)
NEUTROPHILS NFR BLD: 73.8 % (ref 44–72)
NRBC # BLD AUTO: 0 K/UL
NRBC BLD-RTO: 0 /100 WBC
PLATELET # BLD AUTO: 189 K/UL (ref 164–446)
PMV BLD AUTO: 10.5 FL (ref 9–12.9)
POTASSIUM SERPL-SCNC: 3.8 MMOL/L (ref 3.6–5.5)
PROCALCITONIN SERPL-MCNC: 0.11 NG/ML
RBC # BLD AUTO: 4.24 M/UL (ref 4.7–6.1)
SODIUM SERPL-SCNC: 131 MMOL/L (ref 135–145)
WBC # BLD AUTO: 7 K/UL (ref 4.8–10.8)

## 2018-02-17 PROCEDURE — 36415 COLL VENOUS BLD VENIPUNCTURE: CPT

## 2018-02-17 PROCEDURE — 700105 HCHG RX REV CODE 258: Performed by: INTERNAL MEDICINE

## 2018-02-17 PROCEDURE — 85025 COMPLETE CBC W/AUTO DIFF WBC: CPT

## 2018-02-17 PROCEDURE — 770006 HCHG ROOM/CARE - MED/SURG/GYN SEMI*

## 2018-02-17 PROCEDURE — A9270 NON-COVERED ITEM OR SERVICE: HCPCS | Performed by: INTERNAL MEDICINE

## 2018-02-17 PROCEDURE — 51798 US URINE CAPACITY MEASURE: CPT

## 2018-02-17 PROCEDURE — 99232 SBSQ HOSP IP/OBS MODERATE 35: CPT | Performed by: HOSPITALIST

## 2018-02-17 PROCEDURE — 80048 BASIC METABOLIC PNL TOTAL CA: CPT

## 2018-02-17 PROCEDURE — 700102 HCHG RX REV CODE 250 W/ 637 OVERRIDE(OP): Performed by: INTERNAL MEDICINE

## 2018-02-17 PROCEDURE — 84145 PROCALCITONIN (PCT): CPT

## 2018-02-17 RX ADMIN — Medication 500 MG: at 08:30

## 2018-02-17 RX ADMIN — STANDARDIZED SENNA CONCENTRATE AND DOCUSATE SODIUM 2 TABLET: 8.6; 5 TABLET, FILM COATED ORAL at 21:09

## 2018-02-17 RX ADMIN — Medication 500 MCG: at 08:30

## 2018-02-17 RX ADMIN — TAMSULOSIN HYDROCHLORIDE 0.4 MG: 0.4 CAPSULE ORAL at 08:30

## 2018-02-17 RX ADMIN — OXYCODONE HYDROCHLORIDE 5 MG: 5 TABLET ORAL at 08:31

## 2018-02-17 RX ADMIN — OXYCODONE HYDROCHLORIDE 5 MG: 5 TABLET ORAL at 12:22

## 2018-02-17 RX ADMIN — OXYCODONE HYDROCHLORIDE 5 MG: 5 TABLET ORAL at 01:33

## 2018-02-17 RX ADMIN — SODIUM CHLORIDE: 9 INJECTION, SOLUTION INTRAVENOUS at 21:10

## 2018-02-17 RX ADMIN — LEVETIRACETAM 2000 MG: 500 TABLET, FILM COATED ORAL at 08:31

## 2018-02-17 RX ADMIN — OXYCODONE HYDROCHLORIDE 5 MG: 5 TABLET ORAL at 04:42

## 2018-02-17 RX ADMIN — VITAMIN D, TAB 1000IU (100/BT) 5000 UNITS: 25 TAB at 08:31

## 2018-02-17 RX ADMIN — ATORVASTATIN CALCIUM 10 MG: 10 TABLET ORAL at 21:04

## 2018-02-17 RX ADMIN — BACLOFEN 5 MG: 10 TABLET ORAL at 21:05

## 2018-02-17 RX ADMIN — BACLOFEN 5 MG: 10 TABLET ORAL at 08:30

## 2018-02-17 RX ADMIN — SODIUM CHLORIDE: 9 INJECTION, SOLUTION INTRAVENOUS at 08:47

## 2018-02-17 RX ADMIN — OMEPRAZOLE 20 MG: 20 CAPSULE, DELAYED RELEASE ORAL at 08:30

## 2018-02-17 RX ADMIN — STANDARDIZED SENNA CONCENTRATE AND DOCUSATE SODIUM 2 TABLET: 8.6; 5 TABLET, FILM COATED ORAL at 08:31

## 2018-02-17 ASSESSMENT — PAIN SCALES - GENERAL
PAINLEVEL_OUTOF10: 7
PAINLEVEL_OUTOF10: 0
PAINLEVEL_OUTOF10: 6
PAINLEVEL_OUTOF10: 7

## 2018-02-17 ASSESSMENT — LIFESTYLE VARIABLES
DO YOU DRINK ALCOHOL: NO
DO YOU DRINK ALCOHOL: NO

## 2018-02-17 ASSESSMENT — ENCOUNTER SYMPTOMS
FEVER: 0
WEAKNESS: 1
SHORTNESS OF BREATH: 0
HEADACHES: 0
CHILLS: 0
ABDOMINAL PAIN: 0

## 2018-02-17 NOTE — PROGRESS NOTES
[facility] Hospitalist Progress Note    Date of Service: 2018    Chief Complaint  62 y.o. male admitted 2018 with bilateral hip pain after a fall from bed. He was found to have a left femoral neck fracture.     Interval Problem Update  Has significant left leg pain, doesn't remember why he has pain. Was retaining overnight, required straight cath x1.     Consultants/Specialty  Ortho    Disposition  Pending medical clearance.  Resident of [facility].        Review of Systems   Unable to perform ROS: Dementia   Constitutional: Negative for chills and fever.   Respiratory: Negative for shortness of breath.    Cardiovascular: Negative for chest pain.   Gastrointestinal: Negative for abdominal pain.   Musculoskeletal: Positive for joint pain.   Neurological: Positive for weakness. Negative for headaches.      Physical Exam  Laboratory/Imaging   Hemodynamics  Temp (24hrs), Av.5 °C (97.7 °F), Min:36.2 °C (97.1 °F), Max:36.9 °C (98.5 °F)   Temperature: 36.9 °C (98.5 °F)  Pulse  Av.8  Min: 85  Max: 104 Heart Rate (Monitored): 94  Blood Pressure: 121/76, NIBP: 104/85      Respiratory      Respiration: 18, Pulse Oximetry: 95 %, O2 Daily Delivery Respiratory : Room Air with O2 Available        RUL Breath Sounds: Clear, RML Breath Sounds: Clear, RLL Breath Sounds: Clear, JAVIER Breath Sounds: Clear, LLL Breath Sounds: Clear    Fluids    Intake/Output Summary (Last 24 hours) at 18 1402  Last data filed at 18 0330   Gross per 24 hour   Intake                0 ml   Output              400 ml   Net             -400 ml       Nutrition  Orders Placed This Encounter   Procedures   • DIET ORDER     Standing Status:   Standing     Number of Occurrences:   1     Order Specific Question:   Diet:     Answer:   Regular [1]     Physical Exam   Constitutional: He appears well-developed. He appears distressed.   frail   HENT:   Mouth/Throat: Oropharynx is clear and moist.   Eyes: EOM are normal. Pupils are equal, round,  and reactive to light. No scleral icterus.   Neck: Normal range of motion. Neck supple.   Cardiovascular: Normal rate and intact distal pulses.    Pulmonary/Chest: Breath sounds normal. No respiratory distress. He has no rales.   Abdominal: Soft. He exhibits no distension. There is no tenderness.   Musculoskeletal: He exhibits no edema.   Neurological: He is alert. He is disoriented. No cranial nerve deficit.   Skin: Skin is warm and dry.   Psychiatric: His mood appears anxious. His speech is delayed.   Vitals reviewed.      Recent Labs      02/16/18   1403  02/17/18   0357   WBC  10.1  7.0   RBC  4.87  4.24*   HEMOGLOBIN  15.0  13.1*   HEMATOCRIT  47.9  38.7*   MCV  98.4*  91.3   MCH  30.8  30.9   MCHC  31.3*  33.9   RDW  55.9*  49.6   PLATELETCT  238  189   MPV  10.1  10.5     Recent Labs      02/16/18   1403  02/17/18   0357   SODIUM  129*  131*   POTASSIUM  3.9  3.8   CHLORIDE  97  101   CO2  21  23   GLUCOSE  109*  75   BUN  20  22   CREATININE  0.61  0.74   CALCIUM  10.0  9.2     Recent Labs      02/16/18   1403   APTT  37.6*   INR  1.24*                  Assessment/Plan     * Femoral neck fracture (CMS-HCC)- (present on admission)   Assessment & Plan    Mechanical fall from bed. Now with left femoral neck fracture. Patient is a poor historian and unable to give much history. He is high risk for surgery and will work on getting him medically optimized.  - ortho following, greatly appreciate  - pain control  - will obtain echo  - will need to discuss treatment options with guardian        Hypoxia- (present on admission)   Assessment & Plan    Patient with new oxygen requirement. I talked to RN at [facility] who states he has never been on O2 and was 94% on RA when he was assessed after being found on the ground. CXR with some interstitial prominence but no focal consolidation. Only see documentation of O2 sat while on 3 L O2 here. No WBC, VSS and pro calcitonin negative. Likely 2/2 shallow breathing from pain  and atelectasis.  - RT to follow  - incentive spirometer        H/O traumatic brain injury- (present on admission)   Assessment & Plan    H/o remote TBI and is a long term resident of [facility]. Stepfather is his guardian.  - continue keppra        History of DVT (deep vein thrombosis)- (present on admission)   Assessment & Plan    - Holding xarelto for possible surgery.  - SCDs        BPH (benign prostatic hyperplasia)- (present on admission)   Assessment & Plan    Was retaining overnight, required straight cath x1 and 400 removed.   - Continue flomax  - straight cath x1 more time, if still retaining then will need to place a singh          Quality-Core Measures   Reviewed items::  Radiology images reviewed, Labs reviewed, Medications reviewed and EKG reviewed  Singh catheter::  No Singh  DVT prophylaxis - mechanical:  SCDs  Ulcer Prophylaxis::  Yes

## 2018-02-17 NOTE — PROGRESS NOTES
Pt has been unable to really tolerate thin liquids. Pt able to swallow food fine but coughs profusely after drinking thin liquid. Notified MD Rdz and received orders to place pt to nectar thick liquids and swallow evaluation. Clarified with MD Rdz of pt should be NPO until swallow evaluation and Kendell stated no.

## 2018-02-17 NOTE — PROGRESS NOTES
Patient arrived to the unit  From [facility] [address]. Patient is AA&O x3 unaware of date. PIV patent, on 3 LPM. Made comfortable.  2 RN skin check complete with Se RN. Skin is intact with exception to redness and a rash to the buttocks. Waffle overlay in place heels floated.   POC discussed with the patient, Safety measures in place, bed is locked and in lowest position, call light within reach. Unit routine explained along with  Hourly rounding.

## 2018-02-17 NOTE — CARE PLAN
Problem: Communication  Goal: The ability to communicate needs accurately and effectively will improve    Intervention: Educate patient and significant other/support system about the plan of care, procedures, treatments, medications and allow for questions  POC discussed with pt including plans for switching to nectar thick liquids and future swallow evaluation.      Problem: Skin Integrity  Goal: Risk for impaired skin integrity will decrease    Intervention: Implement precautions to protect skin integrity in collaboration with the interdisciplinary team  Waffle cushion in place and q2hr turnings in place.

## 2018-02-17 NOTE — THERAPY
PT eval order received. Per ortho MD pt currently on bedrest as they are trying to contact his POA to determine non-op and comfort care vs surgical intervention. Will defer PT eval until decision has been made and pt is appropriate for therapy.

## 2018-02-17 NOTE — PROGRESS NOTES
Aox3. Disoriented to time. Slow to respond to questions. Complains of pain on left hip. Medicated with tylenol and oxy 5. Denies nausea. Denies SOB. Denies numbness or tingling. PT is a one to one feeder due to weak  and inability to reach things. Able to swallow pills and food ok. Straight cath performed at 0330 after inability to void.Q2 hour turns enforced. Heels floated. Waffle overlay in place. SCDs in place. Bed alarm on.

## 2018-02-17 NOTE — DISCHARGE PLANNING
MICHOACANO received call from JUAN Koch requesting assistance with contacting pt's NOK/POA Valeriy Johnson. MICHOACANO tried the phone number listed on pt's facesheet and it is disconnected. MICHOACANO called [facility]; they do not have a working phone number for Valeriy either. MICHOACANO called pt's brother Jeromy. Jeromy stated that Valeriy [place]'t [place] a phone [place].   JUAN Koch called the 's office to request a welfare check; per August that will happen tomorrow AM. Until Valeriy can be reached, pt's brother Jeromy should be deferred to for decision making. MICHOACANO added Jeromy's information to pt's facesheet.

## 2018-02-17 NOTE — H&P
CHIEF COMPLAINT:  Fall from bed.     HISTORY OF PRESENT ILLNESS:  This is a 62-year-old male with traumatic brain   injury, and lives at [facility], along with hyperlipidemia, history of   DVT on Xarelto, constipation, and BPH.     The patient is a poor historian due to his traumatic brain injury, and was   unable to provide full history.  Apparently, patient fell from bed at around   4:00 a.m. when he turned while sleeping, hitting his hips on the floor.  The   patient denied hitting his chest or head on the floor, and he did not lose   consciousness.  However, he did have significant pain on the bilateral hips,   prompting the facility to send him to the ED.     EMERGENCY DEPARTMENT COURSE:  The patient was initially evaluated in the   emergency department, was maintaining good hemodynamics, on 3 liters nasal   cannula to keep saturations up, and was afebrile.  Initial blood workup showed   unremarkable CBC, with BMP remarkable for sodium of 129.  Alkaline   phosphatase was 106, with normal AST, ALT and total bilirubin, and INR of   1.24.  Creatinine was normal.  Chest x-ray showed mild patchy ground glass   opacity in the left lower base, atelectasis versus pneumonia.  Hip x-ray was   obtained, which showed bilateral femoral neck fracture, with deformity of the   superior pubic ramus on the right, which may be chronic.  The patient was   given pain medications, and Dr. Lyons of orthopedics was contacted to   consult.  Patient was subsequently admitted to the hospitalist service.     REVIEW OF SYSTEMS:  A complete review of system was done, limited by his   traumatic brain injury.  All other systems were negative.     PMH/PSH/FMH: I personally reviewed all ancillary histories as noted.     PAST MEDICAL HISTORY:       Past Medical History:   Diagnosis Date   • BPH (benign prostatic hyperplasia)     • Constipation     • Dementia     • DVT (deep venous thrombosis) (CMS-[facility])     • Failure to thrive (0-17)      • High cholesterol     • Seizure disorder (CMS-[facility])     • TBI (traumatic brain injury) (CMS-[facility])           PAST SURGICAL HISTORY:  Past Surgical History   No past surgical history on file.        PERSONAL/SOCIAL HISTORY:       Social History   Substance Use Topics   • Smoking status: Never Smoker   • Smokeless tobacco: Not on file   • Alcohol use No         FAMILY MEDICAL HISTORY:  Reviewed. Non-contributory.     ALLERGIES:  Depakote [valproic acid]; Heparin; and Phenytoin     HOME MEDICATIONS:          Prior to Admission medications    Medication Sig Start Date End Date Taking? Authorizing Provider   atorvastatin (LIPITOR) 10 MG Tab Take 10 mg by mouth every evening.     Yes Physician Outpatient   baclofen (LIORESAL) 10 MG Tab Take 5 mg by mouth 2 times a day.     Yes Physician Outpatient   cyanocobalamin (VITAMIN B-12) 500 MCG Tab Take 500 mcg by mouth every day.     Yes Physician Outpatient   levetiracetam (KEPPRA) 1000 MG tablet Take 1,000-2,000 mg by mouth 3 times a day. 2000 mg in am and 1000 mg at noon and 1000 mg at 4 pm for seizure disorder     Yes Physician Outpatient   omeprazole (PRILOSEC) 20 MG delayed-release capsule Take 20 mg by mouth every day.     Yes Physician Outpatient   rivaroxaban (XARELTO) 20 MG Tab tablet Take 20 mg by mouth with dinner.     Yes Physician Outpatient   tamsulosin (FLOMAX) 0.4 MG capsule Take 0.4 mg by mouth ONE-HALF HOUR AFTER BREAKFAST.       Ayad Emergency Md Per Protocol, M.D.   Cholecalciferol (VITAMIN D3) 2000 UNIT Cap Take 1 Cap by mouth every day.       Nn Emergency Md Per Protocol, M.D.               PHYSICAL EXAMINATION:  VITAL SIGNS:  Blood pressure 102/80, heart rate 99, respiratory rate 18,   oxygen saturation 94% on 3 liters nasal cannula, temperature 36.3 degrees   Celsius.  CONSTITUTIONAL:  Slow to respond, but answers questions.  Unable to provide   full history, not in cardiorespiratory distress.  HENT: Normocephalic, atraumatic, (-) tonsillopharyngal  congestion or exudate.  EYES: PERRLA, pink conjuctivae, (-) icteric sclerae  NECK: (-) cervical lymphadenopathy, (-) neck rigidity  CARDIOVASCULAR: Distinct heart sounds, RRR, (-) murmurs, rubs, gallops, (-) LE edema  RESPIRATORY: Equal chest expansion, clear breath sounds, (-) crackles/wheezes/rales/rhonchi  GASTROINTESTINAL: normoactive bowel sounds, soft, (-) tenderness, (-) masses, (-) guarding/rebound  MUSCULOSKELETAL:  Limited range of motion of bilateral hips and lower   extremity.  Palpable dorsalis pedis pulses bilaterally.  No other joint   swelling.  SKIN: (-) erythema, warmth, rashes, ulcers, open wounds  PSYCHIATRIC:  Slow to respond, flat affect, unable to fully assess due to TBI.  NEUROLOGIC: Non-focal, moves all 4 extremities, sensory grossly intact        PERTINENT DIAGNOSTIC RESULTS:  Reviewed, and as mentioned above. Please refer to ED course.        ASSESSMENT:  1.  Mechanical fall from bed.  2.  Bilateral femoral neck fracture.  3.  Patchy infiltrate on chest x-ray, with mild hypoxia.  4.  History of traumatic brain injury.  5.  Benign prostatic hypertrophy.  6.  Hyperlipidemia.  7.  History of deep venous thrombosis.  8.  Acquired circulating anticoagulants.     PLAN:  ---  I will admit him to the orthopedic floors.  I anticipate that he will need   at least 2 midnights hospital stay to provide medically necessary services.  ---  Await orthopedic inputs regarding plan for surgical repair of his   bilateral hip fractures.  I will keep him n.p.o. for now, and hold his   anticoagulants in anticipation for need for surgery.  I will continue him on   pain control with oxycodone and IV morphine.  I will start him on Os-Rudy, and   resume his home dose vitamin D supplements.  I will check a vitamin D level.    He will need PT and OT evaluation postoperatively for discharge planning.    Patient already lives at [facility].  ---  I will check for procalcitonin, and if high, I will consider antibiotics.     However, I doubt that he has pneumonia given that he does not have any   symptoms, and the chest x-ray finding is likely just atelectasis.  ---  I will resume his home dose Keppra and baclofen.  ---  I will resume his home dose Flomax.  ---  I will resume his home dose Lipitor.  ---  Hold Xarelto for possible surgery.  Resume postoperatively once cleared by   orthopedics.        DVT prophylaxis -- SCDs.  GI prophylaxis -- Prilosec.  Code status -- FULL CODE.       ____________________________________     ARTURO Nixon / FLYNN    DD:  02/16/2018 18:08:04  DT:  02/16/2018 18:40:54    D#:  [id number]  Job#:  [id number]

## 2018-02-17 NOTE — CONSULTS
DATE OF SERVICE:  02/16/2018    REQUESTING PHYSICIAN:  Dr. Chris Cates, emergency department.    REASON FOR CONSULTATION:  Left femoral neck fracture.    CHIEF COMPLAINT:  Left hip pain.    HISTORY OF PRESENT ILLNESS:  The patient is a 62-year-old male.  He has a   history of traumatic brain injury and seizure disorder.  He is a permanent   resident at [facility].  He was sent to the emergency department by   the physician there today due to complaints of left hip pain.  He possibly   fell at some point.  I was able to contact the physician on call for [facility] who states that he is essentially nonambulatory at baseline, but does   stand on the left lower extremity with assistance to transfer.  I did also   speak with the patient's brother, Jeromy Santizo, who is not his legal   guardian or power of , but was able to relay to me some of his history   and his baseline functionality.  His legal guardian and power of  is   his stepfather, Valeriy Kan, who I have been unable to contact as of yet.    The patient does have a history of injury to the right hip in the past, which   was treated nonsurgically, essentially gone on to a femoral neck fracture   nonunion seen on x-ray today.  There is some mention of this being [facility] in   [facility] in the existing medical documents available here in   the emergency department.  The patient is able to have conversation, but is a   poor historian and it is difficult to keep his train of thought on task as   history is difficult to obtain.    ALLERGIES:  DEPAKOTE, HEPARIN, PHENYTOIN.    MEDICATIONS:  None on file.    OUTPATIENT MEDICATIONS:  Include Lipitor, baclofen, vitamin B12, Keppra,   Prilosec, Xarelto, Flomax, and vitamin D3.    PAST MEDICAL DIAGNOSES:  Include traumatic brain injury with seizure disorder,   high cholesterol, history of failure to thrive, DVT in the left lower   extremity, on anticoagulation, dementia,  constipation, and BPH.    FAMILY HISTORY:  Negative for significant medical problems according to   medical record.    SOCIAL HISTORY:  The patient is a nonsmoker, does not use illicit drugs and   does not use alcohol.  He lives at a long-term assisted living type facility.    REVIEW OF SYSTEMS:  Currently unobtainable from the patient other than that   already stated in the HPI.    PHYSICAL EXAMINATION:  VITAL SIGNS:  Temperature is 97.3, heart rate 97, respiratory rate 16, blood   pressure 106/71, pulse oximetry 94% on room air.  GENERAL APPEARANCE:  The patient is alert.  He is following commands.  He is   pleasant, cooperative.  HEAD, EYES, EARS, NOSE, THROAT:  He is slow to move with facial gestures, does   slur with speech a little.  Otherwise, normocephalic and atraumatic.  PULMONARY:  Symmetric, unlabored breathing.  CARDIOVASCULAR:  Extremities are perfused.  ABDOMEN:  Not obviously distended.  MUSCULOSKELETAL:  Left lower extremity, he has some contractures at the ankle   and the toes.  He is minimally able to dorsi and plantarflex his foot.  He has   a palpable dorsalis pedis pulse.  There are no obvious wounds.  Left lower   extremity, there is no obvious deformity at the knee.  Right lower extremity,   he is able to dorsi and plantarflex his foot, he does have some contractures   to the toes.  He has palpable dorsalis pedis pulse.  He has no pain with   passive hip flexion, extension, internal and external rotation.  Bilateral   upper extremities, he does have some contracture type formation of his hand,   but is grossly able to move them without discomfort, no evidence of obvious   traumatic deformity.    RADIOGRAPHIC DATA:  Plain x-rays, AP pelvis, shows evidence of a displaced   transcervical femoral neck fracture on the left with significant shortening.    He has a right femoral neck fracture, nonunion.    ASSESSMENT:  A 62-year-old male with a history of traumatic brain injury and   seizure  disorder with right femoral neck fracture, nonunion, and an acute left   transcervical femoral neck fracture.  He is minimally ambulatory at baseline,   but does bear weight on left lower extremity for transfers and is having   acute pain in the left hip currently.  He is being evaluated for admission to   the hospitalist service.    RECOMMENDATIONS:  1.  I discussed these findings with the patient's brother and the patient   himself and we discussed treatment options essentially being nonoperative   management with comfort care and mobilization as tolerance versus hip   hemiarthroplasty to allow for improved pain control and mobility even to help   facilitate transfers to minimize the risk of developing bed sores, pneumonia   and secondary complications related to prolonged bed rest.  The patient's   brother expressed understanding and so did the patient himself.  However, I   was unable to get a hold of the patient's legal guardian, Valeriy Kan, his   stepfather, up to this point.  2.  For now, I would recommend bed rest and holding his Xarelto in the event   that his legal guardian wishes for him to proceed with hip hemiarthroplasty at   a later date.  3.  Also, defer to the hospitalists determination as to whether he is   medically optimized or whether he is a higher risk surgical candidate prior to   performing any definitive surgical treatment.       ____________________________________     MD ANDRES Lara / FLYNN    DD:  02/16/2018 16:06:01  DT:  02/16/2018 16:31:00    D#:  [id number]  Job#:  [id number]

## 2018-02-17 NOTE — PROGRESS NOTES
Pt resting in bed most of shift. Waffle mattress overlay in place and q2hr turning in place. Per NOC RN pt had to be straight cath once but pt has since voided twice on this shift. Pt unable to use urinal so condom catheter placed on pt. Pain medicated per MAR. VSS. Will start trying to titrate O2 down.

## 2018-02-17 NOTE — PROGRESS NOTES
Pt tried to void but was having difficulty. Straight cath performed per order after bladder scan of 430 cc. Urine output from straight cath was 400.

## 2018-02-17 NOTE — CARE PLAN
Problem: Communication  Goal: The ability to communicate needs accurately and effectively will improve    Intervention: Educate patient and significant other/support system about the plan of care, procedures, treatments, medications and allow for questions  White board updated with day staff and return time.  PT notified of hourly rounding and unit routine.   Appropriate signs in place at doorway for pt.       Problem: Safety  Goal: Will remain free from falls    Intervention: Implement fall precautions  Pt calls appropriately, treaded socks in use. Personal belongings and call light with in reach and bed is locked in lowest position.    Hourly rounding in place and bed alarm in use

## 2018-02-17 NOTE — THERAPY
2/17:  NO OT eval completed.  Per notes and discussion with PT, awaiting decision re: possible surgery and pt on bedrest. Will follow

## 2018-02-18 ENCOUNTER — APPOINTMENT (OUTPATIENT)
Dept: RADIOLOGY | Facility: MEDICAL CENTER | Age: 63
DRG: 469 | End: 2018-02-18
Attending: HOSPITALIST
Payer: MEDICARE

## 2018-02-18 PROBLEM — G93.40 ACUTE ENCEPHALOPATHY: Status: ACTIVE | Noted: 2018-02-18

## 2018-02-18 LAB
ANION GAP SERPL CALC-SCNC: 8 MMOL/L (ref 0–11.9)
BUN SERPL-MCNC: 15 MG/DL (ref 8–22)
CALCIUM SERPL-MCNC: 9 MG/DL (ref 8.5–10.5)
CHLORIDE SERPL-SCNC: 99 MMOL/L (ref 96–112)
CO2 SERPL-SCNC: 24 MMOL/L (ref 20–33)
CREAT SERPL-MCNC: 0.69 MG/DL (ref 0.5–1.4)
ERYTHROCYTE [DISTWIDTH] IN BLOOD BY AUTOMATED COUNT: 48.5 FL (ref 35.9–50)
GLUCOSE SERPL-MCNC: 79 MG/DL (ref 65–99)
HCT VFR BLD AUTO: 36.5 % (ref 42–52)
HGB BLD-MCNC: 12.7 G/DL (ref 14–18)
MCH RBC QN AUTO: 31.1 PG (ref 27–33)
MCHC RBC AUTO-ENTMCNC: 34.8 G/DL (ref 33.7–35.3)
MCV RBC AUTO: 89.5 FL (ref 81.4–97.8)
PLATELET # BLD AUTO: 166 K/UL (ref 164–446)
PMV BLD AUTO: 10.3 FL (ref 9–12.9)
POTASSIUM SERPL-SCNC: 3.6 MMOL/L (ref 3.6–5.5)
RBC # BLD AUTO: 4.08 M/UL (ref 4.7–6.1)
SODIUM SERPL-SCNC: 131 MMOL/L (ref 135–145)
WBC # BLD AUTO: 6.1 K/UL (ref 4.8–10.8)

## 2018-02-18 PROCEDURE — 700105 HCHG RX REV CODE 258: Performed by: INTERNAL MEDICINE

## 2018-02-18 PROCEDURE — A9270 NON-COVERED ITEM OR SERVICE: HCPCS | Performed by: INTERNAL MEDICINE

## 2018-02-18 PROCEDURE — 36415 COLL VENOUS BLD VENIPUNCTURE: CPT

## 2018-02-18 PROCEDURE — 770006 HCHG ROOM/CARE - MED/SURG/GYN SEMI*

## 2018-02-18 PROCEDURE — 700102 HCHG RX REV CODE 250 W/ 637 OVERRIDE(OP): Performed by: HOSPITALIST

## 2018-02-18 PROCEDURE — 85027 COMPLETE CBC AUTOMATED: CPT

## 2018-02-18 PROCEDURE — 70450 CT HEAD/BRAIN W/O DYE: CPT

## 2018-02-18 PROCEDURE — A9270 NON-COVERED ITEM OR SERVICE: HCPCS | Performed by: HOSPITALIST

## 2018-02-18 PROCEDURE — 700102 HCHG RX REV CODE 250 W/ 637 OVERRIDE(OP): Performed by: INTERNAL MEDICINE

## 2018-02-18 PROCEDURE — 80048 BASIC METABOLIC PNL TOTAL CA: CPT

## 2018-02-18 PROCEDURE — 99232 SBSQ HOSP IP/OBS MODERATE 35: CPT | Performed by: HOSPITALIST

## 2018-02-18 RX ORDER — OXYCODONE HYDROCHLORIDE 5 MG/1
2.5 TABLET ORAL
Status: DISCONTINUED | OUTPATIENT
Start: 2018-02-18 | End: 2018-02-24 | Stop reason: HOSPADM

## 2018-02-18 RX ORDER — ACETAMINOPHEN 325 MG/1
650 TABLET ORAL EVERY 6 HOURS PRN
Status: DISCONTINUED | OUTPATIENT
Start: 2018-02-18 | End: 2018-02-24 | Stop reason: HOSPADM

## 2018-02-18 RX ORDER — MORPHINE SULFATE 4 MG/ML
2 INJECTION, SOLUTION INTRAMUSCULAR; INTRAVENOUS
Status: DISCONTINUED | OUTPATIENT
Start: 2018-02-18 | End: 2018-02-24 | Stop reason: HOSPADM

## 2018-02-18 RX ADMIN — BACLOFEN 5 MG: 10 TABLET ORAL at 20:54

## 2018-02-18 RX ADMIN — BACLOFEN 5 MG: 10 TABLET ORAL at 11:21

## 2018-02-18 RX ADMIN — Medication 500 MG: at 06:41

## 2018-02-18 RX ADMIN — SODIUM CHLORIDE: 9 INJECTION, SOLUTION INTRAVENOUS at 15:21

## 2018-02-18 RX ADMIN — Medication 500 MG: at 17:25

## 2018-02-18 RX ADMIN — OXYCODONE HYDROCHLORIDE 2.5 MG: 5 TABLET ORAL at 21:04

## 2018-02-18 RX ADMIN — TAMSULOSIN HYDROCHLORIDE 0.4 MG: 0.4 CAPSULE ORAL at 11:21

## 2018-02-18 RX ADMIN — Medication 500 MCG: at 11:22

## 2018-02-18 RX ADMIN — OMEPRAZOLE 20 MG: 20 CAPSULE, DELAYED RELEASE ORAL at 11:23

## 2018-02-18 RX ADMIN — ATORVASTATIN CALCIUM 10 MG: 10 TABLET ORAL at 20:54

## 2018-02-18 RX ADMIN — LEVETIRACETAM 1000 MG: 500 TABLET, FILM COATED ORAL at 17:25

## 2018-02-18 RX ADMIN — ACETAMINOPHEN 650 MG: 325 TABLET, FILM COATED ORAL at 06:41

## 2018-02-18 RX ADMIN — VITAMIN D, TAB 1000IU (100/BT) 5000 UNITS: 25 TAB at 11:22

## 2018-02-18 RX ADMIN — LEVETIRACETAM 1000 MG: 500 TABLET, FILM COATED ORAL at 15:21

## 2018-02-18 RX ADMIN — STANDARDIZED SENNA CONCENTRATE AND DOCUSATE SODIUM 2 TABLET: 8.6; 5 TABLET, FILM COATED ORAL at 11:22

## 2018-02-18 RX ADMIN — LEVETIRACETAM 2000 MG: 500 TABLET, FILM COATED ORAL at 11:23

## 2018-02-18 ASSESSMENT — ENCOUNTER SYMPTOMS
SHORTNESS OF BREATH: 0
ABDOMINAL PAIN: 0
CHILLS: 0
FEVER: 0
WEAKNESS: 1

## 2018-02-18 ASSESSMENT — PAIN SCALES - GENERAL
PAINLEVEL_OUTOF10: 0
PAINLEVEL_OUTOF10: 5

## 2018-02-18 ASSESSMENT — LIFESTYLE VARIABLES: DO YOU DRINK ALCOHOL: NO

## 2018-02-18 NOTE — PROGRESS NOTES
[facility] Hospitalist Progress Note    Date of Service: 2018    Chief Complaint  62 y.o. male admitted 2018 with bilateral hip pain after a fall from bed. He was found to have a left femoral neck fracture.     Interval Problem Update  Had a rapid called overnight for altered mentation. He had been getting several doses of oxycodone, neighbor reports increased confusion when he takes it. CT head negative.   This am, awake but less talkative. Responds appropriately.     Consultants/Specialty  Ortho    Disposition  Pending medical clearance.  Resident of [facility].        Review of Systems   Unable to perform ROS: Mental acuity   Constitutional: Negative for chills and fever.   Respiratory: Negative for shortness of breath.    Cardiovascular: Negative for chest pain.   Gastrointestinal: Negative for abdominal pain.   Musculoskeletal: Positive for joint pain.   Neurological: Positive for weakness.      Physical Exam  Laboratory/Imaging   Hemodynamics  Temp (24hrs), Av.9 °C (98.4 °F), Min:36.6 °C (97.8 °F), Max:37.1 °C (98.7 °F)   Temperature: 36.6 °C (97.8 °F)  Pulse  Av  Min: 83  Max: 104    Blood Pressure: 113/77      Respiratory      Respiration: 18, Pulse Oximetry: 93 %        RUL Breath Sounds: Clear, RML Breath Sounds: Diminished, RLL Breath Sounds: Diminished, JAVIER Breath Sounds: Clear, LLL Breath Sounds: Diminished    Fluids    Intake/Output Summary (Last 24 hours) at 18 1347  Last data filed at 18 1100   Gross per 24 hour   Intake              220 ml   Output                0 ml   Net              220 ml       Nutrition  Orders Placed This Encounter   Procedures   • DIET ORDER     Standing Status:   Standing     Number of Occurrences:   1     Order Specific Question:   Diet:     Answer:   Regular [1]     Order Specific Question:   Consistency/Fluid modifications:     Answer:   Nectar Thick [2]     Physical Exam   Constitutional: He appears well-developed. He appears listless. No  distress.   frail   HENT:   Mouth/Throat: Oropharynx is clear and moist.   Eyes: EOM are normal. No scleral icterus.   Neck: Neck supple.   Cardiovascular: Normal rate, regular rhythm and intact distal pulses.    Pulmonary/Chest: Breath sounds normal. No respiratory distress. He has no rales.   Abdominal: Soft. He exhibits no distension. There is no tenderness.   Musculoskeletal: He exhibits no edema.   Neurological: He appears listless. He is disoriented. No cranial nerve deficit.   Skin: Skin is warm and dry.   Psychiatric: He has a normal mood and affect. His mood appears not anxious. His speech is delayed.   Vitals reviewed.      Recent Labs      02/16/18   1403  02/17/18   0357  02/18/18   0350   WBC  10.1  7.0  6.1   RBC  4.87  4.24*  4.08*   HEMOGLOBIN  15.0  13.1*  12.7*   HEMATOCRIT  47.9  38.7*  36.5*   MCV  98.4*  91.3  89.5   MCH  30.8  30.9  31.1   MCHC  31.3*  33.9  34.8   RDW  55.9*  49.6  48.5   PLATELETCT  238  189  166   MPV  10.1  10.5  10.3     Recent Labs      02/16/18   1403  02/17/18   0357  02/18/18   0350   SODIUM  129*  131*  131*   POTASSIUM  3.9  3.8  3.6   CHLORIDE  97  101  99   CO2  21  23  24   GLUCOSE  109*  75  79   BUN  20  22  15   CREATININE  0.61  0.74  0.69   CALCIUM  10.0  9.2  9.0     Recent Labs      02/16/18   1403   APTT  37.6*   INR  1.24*                  Assessment/Plan     * Femoral neck fracture (CMS-HCC)- (present on admission)   Assessment & Plan    Mechanical fall from bed. Now with left femoral neck fracture. Patient is a poor historian and unable to give much history. He is high risk for surgery and will work on getting him medically optimized.  - ortho following, greatly appreciate  - pain control  - Echo pending  - will need to discuss treatment options with guardian (difficult to get a hold) or brother        Hypoxia- (present on admission)   Assessment & Plan    Patient with new oxygen requirement. I talked to RN at [facility] who states he has never been on  O2 and was 94% on RA when he was assessed after being found on the ground. CXR with some interstitial prominence but no focal consolidation. Only see documentation of O2 sat while on 3 L O2 here. No WBC, VSS and pro calcitonin negative. Likely 2/2 shallow breathing from pain and atelectasis.  - RT to follow  - incentive spirometer        Acute encephalopathy   Assessment & Plan    Baseline delayed function from h/o TBI, has required longterm care. Acute change overnight, appears to be metabolic 2/2 narcotics. CT head negative.  - decrease pain medication doses  - frequent orientation  - monitor clinically        Hyponatremia- (present on admission)   Assessment & Plan    Na 129 on admission, now with improvement to 131 with IVF.  - continue NS at 75cc/hr        H/O traumatic brain injury- (present on admission)   Assessment & Plan    H/o remote TBI and is a long term resident of [facility]. Stepfather is his guardian.  - continue keppra        History of DVT (deep vein thrombosis)- (present on admission)   Assessment & Plan    - Holding xarelto for possible surgery.  - SCDs        BPH (benign prostatic hyperplasia)- (present on admission)   Assessment & Plan    Was retaining overnight of 2/16, required straight cath x1 and 400 removed. No longer retaining. Has condom cath in place.  - Continue flomax  - straight cath x1 more time, if still retaining then will need to place a singh          Quality-Core Measures   Reviewed items::  Labs reviewed and Medications reviewed  Singh catheter::  No Singh  DVT: xarelto held for possible surgery.  DVT prophylaxis - mechanical:  SCDs  Ulcer Prophylaxis::  Yes

## 2018-02-18 NOTE — PROGRESS NOTES
Guardian called in and left a message for the unit clerk stating that they will be coming in tomorrow morning for the decision on surgery.  Name of guardian not given.

## 2018-02-18 NOTE — PROGRESS NOTES
CT of head was completed and results were reviewed with Dr. Rdz.  Patient is alert to self.  Tolerating regular diet with nectar thick liquids, speech eval pending since patient may not be tolerating thin liquid.  Patient repositioned q2 hours.  Patient condom catheter was replaced after returning from CT.  Patient Vital   Signs stable and on 3LNC.

## 2018-02-18 NOTE — CARE PLAN
Problem: Skin Integrity  Goal: Risk for impaired skin integrity will decrease  Patient repositioned every two hours. Rough stubble shaved to cut down on upper chest irritation. Condom cath on to prevent skin breakdown.  Barrier cream in use.

## 2018-02-18 NOTE — PROGRESS NOTES
Patient was repositioned and his heart rate became irregular.  Rate varied from 93 to the highest 154.  The rapid nurse was called and by the time he arrived the patient's heart rate was normal.  The patient was more lethargic and not as animated as he had been through the night. Dr Nathan was called and a CT without contrast was ordered since the patient had fallen out of bed prior to admit and a CT had not been done.

## 2018-02-18 NOTE — CARE PLAN
Problem: Safety  Goal: Will remain free from falls  Bed alarm is on.  Frequent rounding on patient.

## 2018-02-19 PROBLEM — R13.10 DYSPHAGIA: Status: ACTIVE | Noted: 2018-02-19

## 2018-02-19 LAB
ALBUMIN SERPL BCP-MCNC: 3.1 G/DL (ref 3.2–4.9)
ALBUMIN/GLOB SERPL: 1.1 G/DL
ALP SERPL-CCNC: 77 U/L (ref 30–99)
ALT SERPL-CCNC: 9 U/L (ref 2–50)
ANION GAP SERPL CALC-SCNC: 5 MMOL/L (ref 0–11.9)
APTT PPP: 38.6 SEC (ref 24.7–36)
AST SERPL-CCNC: 11 U/L (ref 12–45)
BILIRUB SERPL-MCNC: 0.7 MG/DL (ref 0.1–1.5)
BUN SERPL-MCNC: 15 MG/DL (ref 8–22)
CALCIUM SERPL-MCNC: 8.4 MG/DL (ref 8.5–10.5)
CHLORIDE SERPL-SCNC: 101 MMOL/L (ref 96–112)
CO2 SERPL-SCNC: 26 MMOL/L (ref 20–33)
CREAT SERPL-MCNC: 0.73 MG/DL (ref 0.5–1.4)
ERYTHROCYTE [DISTWIDTH] IN BLOOD BY AUTOMATED COUNT: 49.8 FL (ref 35.9–50)
GLOBULIN SER CALC-MCNC: 2.9 G/DL (ref 1.9–3.5)
GLUCOSE SERPL-MCNC: 85 MG/DL (ref 65–99)
HCT VFR BLD AUTO: 35.9 % (ref 42–52)
HGB BLD-MCNC: 12.1 G/DL (ref 14–18)
INR PPP: 1.19 (ref 0.87–1.13)
LV EJECT FRACT  99904: 75
LV EJECT FRACT MOD 4C 99902: 78.59
MCH RBC QN AUTO: 30.6 PG (ref 27–33)
MCHC RBC AUTO-ENTMCNC: 33.7 G/DL (ref 33.7–35.3)
MCV RBC AUTO: 90.7 FL (ref 81.4–97.8)
PLATELET # BLD AUTO: 170 K/UL (ref 164–446)
PMV BLD AUTO: 10 FL (ref 9–12.9)
POTASSIUM SERPL-SCNC: 3.8 MMOL/L (ref 3.6–5.5)
PROT SERPL-MCNC: 6 G/DL (ref 6–8.2)
PROTHROMBIN TIME: 14.8 SEC (ref 12–14.6)
RBC # BLD AUTO: 3.96 M/UL (ref 4.7–6.1)
SODIUM SERPL-SCNC: 132 MMOL/L (ref 135–145)
WBC # BLD AUTO: 6 K/UL (ref 4.8–10.8)

## 2018-02-19 PROCEDURE — 770006 HCHG ROOM/CARE - MED/SURG/GYN SEMI*

## 2018-02-19 PROCEDURE — 85610 PROTHROMBIN TIME: CPT

## 2018-02-19 PROCEDURE — A9270 NON-COVERED ITEM OR SERVICE: HCPCS | Performed by: INTERNAL MEDICINE

## 2018-02-19 PROCEDURE — 51798 US URINE CAPACITY MEASURE: CPT

## 2018-02-19 PROCEDURE — 85730 THROMBOPLASTIN TIME PARTIAL: CPT

## 2018-02-19 PROCEDURE — 93306 TTE W/DOPPLER COMPLETE: CPT | Mod: 26 | Performed by: INTERNAL MEDICINE

## 2018-02-19 PROCEDURE — 80053 COMPREHEN METABOLIC PANEL: CPT

## 2018-02-19 PROCEDURE — 85027 COMPLETE CBC AUTOMATED: CPT

## 2018-02-19 PROCEDURE — 99232 SBSQ HOSP IP/OBS MODERATE 35: CPT | Performed by: HOSPITALIST

## 2018-02-19 PROCEDURE — 700102 HCHG RX REV CODE 250 W/ 637 OVERRIDE(OP): Performed by: INTERNAL MEDICINE

## 2018-02-19 PROCEDURE — A9270 NON-COVERED ITEM OR SERVICE: HCPCS | Performed by: HOSPITALIST

## 2018-02-19 PROCEDURE — 700105 HCHG RX REV CODE 258: Performed by: INTERNAL MEDICINE

## 2018-02-19 PROCEDURE — 93306 TTE W/DOPPLER COMPLETE: CPT

## 2018-02-19 PROCEDURE — 700102 HCHG RX REV CODE 250 W/ 637 OVERRIDE(OP): Performed by: HOSPITALIST

## 2018-02-19 PROCEDURE — 36415 COLL VENOUS BLD VENIPUNCTURE: CPT

## 2018-02-19 RX ADMIN — SODIUM CHLORIDE: 9 INJECTION, SOLUTION INTRAVENOUS at 04:18

## 2018-02-19 RX ADMIN — SODIUM CHLORIDE: 9 INJECTION, SOLUTION INTRAVENOUS at 17:19

## 2018-02-19 RX ADMIN — OXYCODONE HYDROCHLORIDE 2.5 MG: 5 TABLET ORAL at 21:51

## 2018-02-19 RX ADMIN — ATORVASTATIN CALCIUM 10 MG: 10 TABLET ORAL at 21:50

## 2018-02-19 RX ADMIN — OXYCODONE HYDROCHLORIDE 2.5 MG: 5 TABLET ORAL at 10:40

## 2018-02-19 RX ADMIN — BACLOFEN 5 MG: 10 TABLET ORAL at 09:48

## 2018-02-19 RX ADMIN — LEVETIRACETAM 2000 MG: 500 TABLET, FILM COATED ORAL at 09:47

## 2018-02-19 RX ADMIN — LEVETIRACETAM 1000 MG: 500 TABLET, FILM COATED ORAL at 17:09

## 2018-02-19 RX ADMIN — OMEPRAZOLE 20 MG: 20 CAPSULE, DELAYED RELEASE ORAL at 09:27

## 2018-02-19 RX ADMIN — LEVETIRACETAM 1000 MG: 500 TABLET, FILM COATED ORAL at 12:48

## 2018-02-19 RX ADMIN — BACLOFEN 5 MG: 10 TABLET ORAL at 21:50

## 2018-02-19 RX ADMIN — OXYCODONE HYDROCHLORIDE 2.5 MG: 5 TABLET ORAL at 17:19

## 2018-02-19 ASSESSMENT — ENCOUNTER SYMPTOMS
SHORTNESS OF BREATH: 0
WEAKNESS: 1
FEVER: 0
CHILLS: 0
ABDOMINAL PAIN: 0

## 2018-02-19 ASSESSMENT — PAIN SCALES - GENERAL
PAINLEVEL_OUTOF10: 5
PAINLEVEL_OUTOF10: 3
PAINLEVEL_OUTOF10: 5

## 2018-02-19 NOTE — CARE PLAN
Problem: Safety  Goal: Will remain free from injury  Outcome: PROGRESSING AS EXPECTED  Safety education provided. Call light in reach, low light on, non slip socks.     Problem: Knowledge Deficit  Goal: Knowledge of disease process/condition, treatment plan, diagnostic tests, and medications will improve  Outcome: PROGRESSING AS EXPECTED  Education provided. Tests, labs, medications reviewed. Questions answered.

## 2018-02-19 NOTE — PROGRESS NOTES
62yoM with TBI and right femoral neck nonunion.  Has acute left displaced femoral neck fx.    S: POA arrived today.  Mauricio is having persistent pain in left hip.    O:    Vitals:    02/18/18 1547 02/18/18 1939 02/19/18 0400 02/19/18 0918   BP: 122/81 108/73 121/89    Pulse: 84 90 83 84   Resp: 16 18 18 18   Temp: 36.8 °C (98.2 °F) 37.1 °C (98.7 °F) 37.1 °C (98.8 °F)    SpO2: 95% 91% 94% 93%   Weight:       Height:         Exam:  General-NAD, alert and following commands  LLE-NVI    A: 62yoM with TBI and right femoral neck nonunion.  Has acute left displaced femoral neck fx.    Recs;  -I discussed treatment options with patients stepfather and POA today.  We discussed option for comfort care and bedrest vs. Hemiarthroplasty.  We discussed pros and cons of each including potential risks of surgery.  He would like Mauricio to have surgery.  Mauricio is also in agreement.  -Will schedule for left hip hemiarthroplasty for today, if possible, otherwise for tomorrow am  -continue bedrest and NPO

## 2018-02-19 NOTE — PROGRESS NOTES
[facility] Hospitalist Progress Note    Date of Service: 2018    Chief Complaint  62 y.o. male admitted 2018 with bilateral hip pain after a fall from bed. He was found to have a left femoral neck fracture.     Interval Problem Update  Doing well this AM, having an echo performed. He is much more alert and talkative today. Narcotics were decreased yesterday after his lethargy. Endorses pain of leg but no where else. No acute overnight events.    Stepfather, guardian is coming in this AM to discuss possible surgical repair of his femur fracture.    Consultants/Specialty  Ortho    Disposition  Pending medical clearance.  Resident of [facility].        Review of Systems   Unable to perform ROS: Mental acuity   Constitutional: Negative for chills and fever.   Respiratory: Negative for shortness of breath.    Cardiovascular: Negative for chest pain.   Gastrointestinal: Negative for abdominal pain.   Musculoskeletal: Positive for joint pain.   Neurological: Positive for weakness.      Physical Exam  Laboratory/Imaging   Hemodynamics  Temp (24hrs), Av.9 °C (98.4 °F), Min:36.6 °C (97.8 °F), Max:37.1 °C (98.8 °F)   Temperature: 37.1 °C (98.8 °F)  Pulse  Av.5  Min: 83  Max: 104    Blood Pressure: 121/89      Respiratory      Respiration: 18, Pulse Oximetry: 93 %, O2 Daily Delivery Respiratory : Silicone Nasal Cannula     Work Of Breathing / Effort: Mild  RUL Breath Sounds: Clear, RML Breath Sounds: Clear, RLL Breath Sounds: Diminished, JAVIER Breath Sounds: Clear, LLL Breath Sounds: Diminished    Fluids    Intake/Output Summary (Last 24 hours) at 18 1104  Last data filed at 18 0700   Gross per 24 hour   Intake              700 ml   Output             1700 ml   Net            -1000 ml       Nutrition  Orders Placed This Encounter   Procedures   • DIET NPO     Standing Status:   Standing     Number of Occurrences:   8     Order Specific Question:   Restrict to:     Answer:   Sips with Medications [3]      Physical Exam   Constitutional: He appears well-developed. He is cooperative. No distress.   frail   HENT:   Mouth/Throat: Oropharynx is clear and moist.   Eyes: EOM are normal. No scleral icterus.   Neck: Neck supple.   Cardiovascular: Normal rate, regular rhythm and intact distal pulses.    Pulmonary/Chest: Breath sounds normal. No respiratory distress. He has no rales.   Abdominal: Soft. He exhibits no distension. There is no tenderness.   Musculoskeletal: He exhibits no edema.   Neurological: He is alert. No cranial nerve deficit.   AOx2   Skin: Skin is warm and dry.   Psychiatric: He has a normal mood and affect. His mood appears not anxious. His speech is delayed.   Vitals reviewed.      Recent Labs      02/17/18   0357  02/18/18   0350  02/19/18   0250   WBC  7.0  6.1  6.0   RBC  4.24*  4.08*  3.96*   HEMOGLOBIN  13.1*  12.7*  12.1*   HEMATOCRIT  38.7*  36.5*  35.9*   MCV  91.3  89.5  90.7   MCH  30.9  31.1  30.6   MCHC  33.9  34.8  33.7   RDW  49.6  48.5  49.8   PLATELETCT  189  166  170   MPV  10.5  10.3  10.0     Recent Labs      02/17/18   0357  02/18/18   0350  02/19/18   0250   SODIUM  131*  131*  132*   POTASSIUM  3.8  3.6  3.8   CHLORIDE  101  99  101   CO2  23  24  26   GLUCOSE  75  79  85   BUN  22  15  15   CREATININE  0.74  0.69  0.73   CALCIUM  9.2  9.0  8.4*     Recent Labs      02/16/18   1403  02/19/18   0250   APTT  37.6*  38.6*   INR  1.24*  1.19*                  Assessment/Plan     * Femoral neck fracture (CMS-[facility])- (present on admission)   Assessment & Plan    Mechanical fall from bed. Now with left femoral neck fracture. Patient is a poor historian and unable to give much history. He is high risk for surgery but is medically optimized.  - ortho following, greatly appreciate  - pain control  - Echo read pending  - guardian is coming in this AM to discuss conservative management vs surgical repair  - NPO this AM for possible surgery and xarelto has been held        Dysphagia- (present  on admission)   Assessment & Plan    Concern from nursing staff about coughing with thin liquids.  - regular diet with thick liquids, with supervision  - SLP evaluation pending, greatly appreicate        Hypoxia- (present on admission)   Assessment & Plan    Patient with new oxygen requirement. I talked to RN at [facility] who states he has never been on O2 and was 94% on RA when he was assessed after being found on the ground. CXR with some interstitial prominence but no focal consolidation. Only see documentation of O2 sat while on 3 L O2 here. No WBC, VSS and pro calcitonin negative. Likely 2/2 shallow breathing from pain and atelectasis.  - RT to follow  - regularly encourage incentive spirometer  - O2 supplementation as needed        Hyponatremia- (present on admission)   Assessment & Plan    Improving. Na 129 on admission, now with improvement to 132 with IVF.  - continue NS at 75cc/hr        H/O traumatic brain injury- (present on admission)   Assessment & Plan    H/o remote TBI and is a long term resident of [facility]. Stepfather is his guardian.  - continue keppra        History of DVT (deep vein thrombosis)- (present on admission)   Assessment & Plan    - Holding xarelto for possible surgery  - SCDs        Acute encephalopathy   Assessment & Plan    Resolved. Baseline delayed function from h/o TBI, has required longterm care. Acute change overnight from 2/17-2/18, appears to be metabolic 2/2 narcotics. CT head negative.  - decrease pain medication doses  - frequent orientation  - monitor clinically        BPH (benign prostatic hyperplasia)- (present on admission)   Assessment & Plan    Was retaining overnight of 2/16, required straight cath x1 and 400 removed. No longer retaining. Has condom cath in place.  - Continue flomax  - straight cath x1 more time, if still retaining then will need to place a singh          Quality-Core Measures   Reviewed items::  Labs reviewed and Medications reviewed  Singh  catheter::  No Sam  DVT: xarelto held for possible surgery.  DVT prophylaxis - mechanical:  SCDs  Ulcer Prophylaxis::  Yes

## 2018-02-19 NOTE — THERAPY
SPEECH THERAPY CONTACT NOTE:     This SLP attempted to see patient for clinical swallow evaluation. Per RN and EMR, patient likely having surgery today and NPO d/t same. Will hold CSE d/t same and re-attempt later as able and appropriate. Thank you.

## 2018-02-20 ENCOUNTER — APPOINTMENT (OUTPATIENT)
Dept: RADIOLOGY | Facility: MEDICAL CENTER | Age: 63
DRG: 469 | End: 2018-02-20
Attending: ORTHOPAEDIC SURGERY
Payer: MEDICARE

## 2018-02-20 ENCOUNTER — APPOINTMENT (OUTPATIENT)
Dept: RADIOLOGY | Facility: MEDICAL CENTER | Age: 63
DRG: 469 | End: 2018-02-20
Attending: FAMILY MEDICINE
Payer: MEDICARE

## 2018-02-20 LAB
ANION GAP SERPL CALC-SCNC: 10 MMOL/L (ref 0–11.9)
BUN SERPL-MCNC: 11 MG/DL (ref 8–22)
CALCIUM SERPL-MCNC: 8.9 MG/DL (ref 8.5–10.5)
CHLORIDE SERPL-SCNC: 99 MMOL/L (ref 96–112)
CO2 SERPL-SCNC: 22 MMOL/L (ref 20–33)
CREAT SERPL-MCNC: 0.58 MG/DL (ref 0.5–1.4)
ERYTHROCYTE [DISTWIDTH] IN BLOOD BY AUTOMATED COUNT: 49.7 FL (ref 35.9–50)
GLUCOSE SERPL-MCNC: 80 MG/DL (ref 65–99)
HCT VFR BLD AUTO: 38.5 % (ref 42–52)
HGB BLD-MCNC: 13.2 G/DL (ref 14–18)
MCH RBC QN AUTO: 31.1 PG (ref 27–33)
MCHC RBC AUTO-ENTMCNC: 34.3 G/DL (ref 33.7–35.3)
MCV RBC AUTO: 90.8 FL (ref 81.4–97.8)
PLATELET # BLD AUTO: 167 K/UL (ref 164–446)
PMV BLD AUTO: 10 FL (ref 9–12.9)
POTASSIUM SERPL-SCNC: 3.7 MMOL/L (ref 3.6–5.5)
RBC # BLD AUTO: 4.24 M/UL (ref 4.7–6.1)
SODIUM SERPL-SCNC: 131 MMOL/L (ref 135–145)
WBC # BLD AUTO: 6 K/UL (ref 4.8–10.8)

## 2018-02-20 PROCEDURE — 99233 SBSQ HOSP IP/OBS HIGH 50: CPT | Performed by: FAMILY MEDICINE

## 2018-02-20 PROCEDURE — A9270 NON-COVERED ITEM OR SERVICE: HCPCS

## 2018-02-20 PROCEDURE — 700105 HCHG RX REV CODE 258: Performed by: INTERNAL MEDICINE

## 2018-02-20 PROCEDURE — 160042 HCHG SURGERY MINUTES - EA ADDL 1 MIN LEVEL 5: Performed by: ORTHOPAEDIC SURGERY

## 2018-02-20 PROCEDURE — 36415 COLL VENOUS BLD VENIPUNCTURE: CPT

## 2018-02-20 PROCEDURE — 0SRS0J9 REPLACEMENT OF LEFT HIP JOINT, FEMORAL SURFACE WITH SYNTHETIC SUBSTITUTE, CEMENTED, OPEN APPROACH: ICD-10-PCS | Performed by: ORTHOPAEDIC SURGERY

## 2018-02-20 PROCEDURE — 700111 HCHG RX REV CODE 636 W/ 250 OVERRIDE (IP)

## 2018-02-20 PROCEDURE — 160036 HCHG PACU - EA ADDL 30 MINS PHASE I: Performed by: ORTHOPAEDIC SURGERY

## 2018-02-20 PROCEDURE — 85027 COMPLETE CBC AUTOMATED: CPT

## 2018-02-20 PROCEDURE — 502578 HCHG PACK, TOTAL HIP: Performed by: ORTHOPAEDIC SURGERY

## 2018-02-20 PROCEDURE — G8996 SWALLOW CURRENT STATUS: HCPCS | Mod: CK

## 2018-02-20 PROCEDURE — 160009 HCHG ANES TIME/MIN: Performed by: ORTHOPAEDIC SURGERY

## 2018-02-20 PROCEDURE — L8699 PROSTHETIC IMPLANT NOS: HCPCS | Performed by: ORTHOPAEDIC SURGERY

## 2018-02-20 PROCEDURE — 71045 X-RAY EXAM CHEST 1 VIEW: CPT

## 2018-02-20 PROCEDURE — 502000 HCHG MISC OR IMPLANTS RC 0278: Performed by: ORTHOPAEDIC SURGERY

## 2018-02-20 PROCEDURE — 501838 HCHG SUTURE GENERAL: Performed by: ORTHOPAEDIC SURGERY

## 2018-02-20 PROCEDURE — 160048 HCHG OR STATISTICAL LEVEL 1-5: Performed by: ORTHOPAEDIC SURGERY

## 2018-02-20 PROCEDURE — 160035 HCHG PACU - 1ST 60 MINS PHASE I: Performed by: ORTHOPAEDIC SURGERY

## 2018-02-20 PROCEDURE — 92610 EVALUATE SWALLOWING FUNCTION: CPT

## 2018-02-20 PROCEDURE — 80048 BASIC METABOLIC PNL TOTAL CA: CPT

## 2018-02-20 PROCEDURE — 700102 HCHG RX REV CODE 250 W/ 637 OVERRIDE(OP): Performed by: INTERNAL MEDICINE

## 2018-02-20 PROCEDURE — 700101 HCHG RX REV CODE 250

## 2018-02-20 PROCEDURE — 160031 HCHG SURGERY MINUTES - 1ST 30 MINS LEVEL 5: Performed by: ORTHOPAEDIC SURGERY

## 2018-02-20 PROCEDURE — A9270 NON-COVERED ITEM OR SERVICE: HCPCS | Performed by: INTERNAL MEDICINE

## 2018-02-20 PROCEDURE — 700111 HCHG RX REV CODE 636 W/ 250 OVERRIDE (IP): Performed by: ORTHOPAEDIC SURGERY

## 2018-02-20 PROCEDURE — G8997 SWALLOW GOAL STATUS: HCPCS | Mod: CI

## 2018-02-20 PROCEDURE — 770006 HCHG ROOM/CARE - MED/SURG/GYN SEMI*

## 2018-02-20 PROCEDURE — 51798 US URINE CAPACITY MEASURE: CPT

## 2018-02-20 PROCEDURE — 72170 X-RAY EXAM OF PELVIS: CPT

## 2018-02-20 PROCEDURE — 700102 HCHG RX REV CODE 250 W/ 637 OVERRIDE(OP)

## 2018-02-20 PROCEDURE — 160002 HCHG RECOVERY MINUTES (STAT): Performed by: ORTHOPAEDIC SURGERY

## 2018-02-20 DEVICE — BONE CEMENT SIMPLEX FULL DOSE - (10EA/PK): Type: IMPLANTABLE DEVICE | Site: HIP | Status: FUNCTIONAL

## 2018-02-20 DEVICE — IMPLANTABLE DEVICE: Type: IMPLANTABLE DEVICE | Site: HIP | Status: FUNCTIONAL

## 2018-02-20 RX ORDER — CEFAZOLIN SODIUM 2 G/100ML
2 INJECTION, SOLUTION INTRAVENOUS EVERY 8 HOURS
Status: COMPLETED | OUTPATIENT
Start: 2018-02-20 | End: 2018-02-21

## 2018-02-20 RX ORDER — OXYCODONE HCL 5 MG/5 ML
SOLUTION, ORAL ORAL
Status: COMPLETED
Start: 2018-02-20 | End: 2018-02-20

## 2018-02-20 RX ADMIN — STANDARDIZED SENNA CONCENTRATE AND DOCUSATE SODIUM 2 TABLET: 8.6; 5 TABLET, FILM COATED ORAL at 20:12

## 2018-02-20 RX ADMIN — Medication 500 MG: at 17:29

## 2018-02-20 RX ADMIN — CEFAZOLIN SODIUM 2 G: 2 INJECTION, SOLUTION INTRAVENOUS at 17:29

## 2018-02-20 RX ADMIN — LEVETIRACETAM 1000 MG: 500 TABLET, FILM COATED ORAL at 13:49

## 2018-02-20 RX ADMIN — ATORVASTATIN CALCIUM 10 MG: 10 TABLET ORAL at 20:11

## 2018-02-20 RX ADMIN — OXYCODONE HYDROCHLORIDE 10 MG: 5 SOLUTION ORAL at 10:41

## 2018-02-20 RX ADMIN — BACLOFEN 5 MG: 10 TABLET ORAL at 20:11

## 2018-02-20 RX ADMIN — SODIUM CHLORIDE: 9 INJECTION, SOLUTION INTRAVENOUS at 13:59

## 2018-02-20 RX ADMIN — LEVETIRACETAM 1000 MG: 500 TABLET, FILM COATED ORAL at 17:29

## 2018-02-20 RX ADMIN — FENTANYL CITRATE 50 MCG: 50 INJECTION, SOLUTION INTRAMUSCULAR; INTRAVENOUS at 10:56

## 2018-02-20 ASSESSMENT — PAIN SCALES - GENERAL
PAINLEVEL_OUTOF10: 2
PAINLEVEL_OUTOF10: 0
PAINLEVEL_OUTOF10: 2
PAINLEVEL_OUTOF10: 2
PAINLEVEL_OUTOF10: 4
PAINLEVEL_OUTOF10: 0
PAINLEVEL_OUTOF10: 4
PAINLEVEL_OUTOF10: 0
PAINLEVEL_OUTOF10: 0

## 2018-02-20 NOTE — OR SURGEON
Immediate Post OP Note    PreOp Diagnosis: left displaced femoral neck fx    PostOp Diagnosis: same    Procedure(s):  HIP HEMIARTHROPLASTY - Wound Class: Clean    Surgeon(s):  Shaheed Lyons M.D.    Anesthesiologist/Type of Anesthesia:  Anesthesiologist: Kiran Moore M.D./General    Surgical Staff:  Circulator: Amparo Jim R.N.  Limb Welsh: Lewis Pressley  Scrub Person: Kaela Schaefer  First Assist: Lewis Judge P.A.-C.    Specimens: none known    Estimated Blood Loss: 150cc    Findings: see dictation    Complications: none known    PLAN:  --WBAT LLE  --posterior hip precautions  --readmit medicine postop  --ancef x 2 doses postop  --PT/OT for transfering        2/20/2018 10:23 AM Shaheed Lyons

## 2018-02-20 NOTE — CARE PLAN
Problem: Bowel/Gastric:  Goal: Normal bowel function is maintained or improved  No n/v. Abdomen rounded, soft.     Problem: Respiratory:  Goal: Respiratory status will improve  Pt on 3L o2 nc. Respirations equal and unlabored.

## 2018-02-20 NOTE — THERAPY
Speech Language Therapy Clinical Swallow Evaluation completed.  Functional Status: Patient s/p surgery today and started on Dys2/NTL diet by RN. Per CNA, patient had difficulty consuming soft, chopped solids during lunch today. Patient with impaired cognition d/t hx of TBI and noted to have vocal quality on decreased intensity, which is baseline for patient. Vocal quality noted to be wet/gurgly at times when this SLP entered room while patient was consuming PO from lunch tray. Patient noted to not have upper denture plate in place and thus, it was placed by this SLP. Patient unable to complete oral motor evaluation consistently d/t impaired cognition, but upon inspection, no gross deficits noted. Patient consumed PO trials of NTL via tsp and cup sip, purees, pudding, soft solids, and thin liquids via cup sip. Patient presented with wet/gurgly vocal quality x2 on NTL via cup sip, which cleared with verbal cues for strong cough/throat clear. Patient had no overt s/sx of aspiration noted on purees or pudding. PO trials of soft solids resulted in immediate and delayed coughing/choking, with patient eventually spitting out solids. Patient had immediate coughing on thin liquids via cup sip, requiring oral suction to clear vocal quality and oral cavity. Patient requires assistance for feeding d/t poor coordination and weak BUE. At this time, recommend patient downgrade to NTFL diet with 1:1 feeding. Crush meds in applesauce. RN aware. SLP to follow. Thank you for the consult.     Recommendations - Diet: Nectar Thick Full Liquid w/ strict use of posted swallow precautions                           Strategies: Strict 1:1 feeding , No Straws and Head of Bed at 90 Degrees                          Medication Administration: Crush all Medications in Puree  Plan of Care: Will benefit from Speech Therapy 3 times per week  Post-Acute Therapy: Discharge to a transitional care facility for continued skilled therapy services.    See  "\"Rehab Therapy-Acute\" Patient Summary Report for complete documentation.   "

## 2018-02-20 NOTE — PROGRESS NOTES
Family at bedside notified that surgery is delayed to 10pm. Apologized. Family and patient verbalized understanding.

## 2018-02-20 NOTE — PROGRESS NOTES
Dr Lyons called and stated it's getting too late and surgery will be done in the morning instead. Will give patient his meds tonight and feed him dinner. Family at bedside aware and verbalized understanding.

## 2018-02-20 NOTE — OP REPORT
DATE OF SERVICE:  02/20/2018    PREOPERATIVE DIAGNOSIS:  Left displaced femoral neck fracture.    POSTOPERATIVE DIAGNOSIS:  Left displaced femoral neck fracture.    PROCEDURE PERFORMED:  Open treatment of left femoral neck fracture with hip   hemiarthroplasty.    SURGEON:  Shaheed Lyons MD    ANESTHESIOLOGIST:  Kiran Moore MD    ANESTHESIA:  General.    ESTIMATED BLOOD LOSS:  150 mL.    ASSISTANT:  Lewis Judge PA-C    IMPLANTS:  DePuy cemented hip hemiarthroplasty with following components:  1.  Size 4 basic [place] cemented femoral stem.  2.  A 53 mm x +5 mm bipolar femoral head.    INDICATION FOR PROCEDURE:  Patient is a 62-year-old male.  He has a history of   traumatic brain injury and is minimally ambulatory, but he does rely on his   left lower extremity to transfer with assistance.  He presented to [facility] from his long-term care facility with left hip pain, he   was found to have a left displaced femoral neck fracture.  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    I discussed patient is not able to provide his own informed consent, he has a   power of  who is his stepfather, we had difficulty reaching and   ultimately was able to have him come in the hospital to discuss treatment   options.  He elected to have his Mauricio keane proceed with hip   hemiarthroplasty as opposed to comfort care.    DESCRIPTION OF PROCEDURE:  Patient was met in the preoperative holding area,   his surgical site was signed and his consent was confirmed to be accurate.  He   was taken back to the operating room and general anesthesia was induced.    Ancef and vancomycin were administered.  He was positioned to the right   lateral decubitus position with Stulberg positioners.  Left lower extremity   was prepped and draped in the usual sterile fashion.  A formal time-out was   performed to confirm patient's correct name, correct  surgical site, correct   procedure and correct laterality.  Posterior approach to the hip was then   performed with a scalpel down through skin.  Dissection was carried with Bovie   cautery down to the iliotibial band and gluteal fascia, which was split   longitudinally.  The gluteus genet fibers were retracted with Charnley   retractor.  The piriformis tendon was identified.  A plane was developed in   between piriformis and gluteus minimus and short external rotators including   the piriformis were released off the proximal femur and a full-thickness   sleeve with the posterior capsule.  Fracture was identified and in situ   femoral neck cut was made with an oscillating saw and appropriate retractors   in place.  I then removed the femoral head.  There was mild acetabular and   femoral head wear, I measured 53 mm.  I then cleared the acetabulum of any   bony fragments or loose soft tissue.  I then started repairing the femur and   with appropriate retractors in place using a box osteotome followed by canal   finding reamer followed by sequential reaming and broaching, I felt that size   4 stem was good fill of the proximal femur.  I then trialed with +5 bipolar   femoral head and he had excellent stability parameters.  I then removed trial   implants.  I thoroughly irrigated the canal, inserted the cement restrictor   mixed vancomycin impregnated cement filled the canal, pressurized it and   inserted the size 4 [place] basic stem adding about 10 degrees of anteversion   compared to his native anteversion and then removed excess cement while it   cured.  Once it was sufficiently cured, I trialed again with +5 mm bipolar   femoral head.  He had excellent stability parameters.  His limb lengths were   difficult to determine given his nonunion of the femoral neck on the right   side, but I felt there is appropriate amount of tension on the soft tissues.    I then removed the trial femoral head, thoroughly dried the  Geller taper and   inserted component femoral head and reduced the hip and soaked the wound in   dilute Betadine solution for 3 minutes.  I repaired the short external   rotators including the posterior capsule through bone tunnels and the greater   trochanter with #5 Tycron.  I repaired the IT band and gluteal fascia with   size 2 Stratafix suture, subQ layers with 0 Vicryl, 2-0 Vicryl, and skin edges   with staples.  The wound was thoroughly cleansed and dried and sterile silver   impregnated dressing was applied.  He was then transferred on the [facility] from anesthesia and taken to the post-anesthesia care unit in stable   Condition.    August Judge PA-C, was present for the duration of the procedure and   assisted with patient positioning, retracting and wound closure.    PLAN:  1.  Patient will be readmitted to the medical service postop.  2.  He can be weightbearing as tolerated on the left lower extremity, but   should maintain posterior hip precautions at all times.  3.  He will be readmitted to the medicine service postop.  4.  He will need Ancef 2 doses postop for routine infection prophylaxis.  5.  He can work with physical and occupational therapy as soon as possible for   mobilization.       ____________________________________     MD ANDRES Lara / FLYNN    DD:  02/20/2018 10:34:35  DT:  02/20/2018 11:03:59    D#:  [id number]  Job#:  [id number]

## 2018-02-21 PROBLEM — E55.9 VITAMIN D DEFICIENCY: Status: ACTIVE | Noted: 2018-02-21

## 2018-02-21 PROBLEM — M79.89 SWELLING OF RIGHT UPPER EXTREMITY: Status: ACTIVE | Noted: 2018-02-21

## 2018-02-21 PROBLEM — M84.353K: Status: ACTIVE | Noted: 2018-02-21

## 2018-02-21 LAB
25(OH)D3 SERPL-MCNC: 23 NG/ML (ref 30–100)
ANION GAP SERPL CALC-SCNC: 7 MMOL/L (ref 0–11.9)
BASOPHILS # BLD AUTO: 0.1 % (ref 0–1.8)
BASOPHILS # BLD: 0.01 K/UL (ref 0–0.12)
BNP SERPL-MCNC: 100 PG/ML (ref 0–100)
BUN SERPL-MCNC: 18 MG/DL (ref 8–22)
CALCIUM SERPL-MCNC: 9.1 MG/DL (ref 8.5–10.5)
CHLORIDE SERPL-SCNC: 100 MMOL/L (ref 96–112)
CO2 SERPL-SCNC: 24 MMOL/L (ref 20–33)
CREAT SERPL-MCNC: 0.56 MG/DL (ref 0.5–1.4)
EOSINOPHIL # BLD AUTO: 0.01 K/UL (ref 0–0.51)
EOSINOPHIL NFR BLD: 0.1 % (ref 0–6.9)
ERYTHROCYTE [DISTWIDTH] IN BLOOD BY AUTOMATED COUNT: 46.3 FL (ref 35.9–50)
GLUCOSE SERPL-MCNC: 119 MG/DL (ref 65–99)
HCT VFR BLD AUTO: 33.1 % (ref 42–52)
HGB BLD-MCNC: 11.4 G/DL (ref 14–18)
IMM GRANULOCYTES # BLD AUTO: 0.03 K/UL (ref 0–0.11)
IMM GRANULOCYTES NFR BLD AUTO: 0.3 % (ref 0–0.9)
LYMPHOCYTES # BLD AUTO: 0.83 K/UL (ref 1–4.8)
LYMPHOCYTES NFR BLD: 9 % (ref 22–41)
MCH RBC QN AUTO: 30.8 PG (ref 27–33)
MCHC RBC AUTO-ENTMCNC: 34.4 G/DL (ref 33.7–35.3)
MCV RBC AUTO: 89.5 FL (ref 81.4–97.8)
MONOCYTES # BLD AUTO: 0.98 K/UL (ref 0–0.85)
MONOCYTES NFR BLD AUTO: 10.6 % (ref 0–13.4)
NEUTROPHILS # BLD AUTO: 7.37 K/UL (ref 1.82–7.42)
NEUTROPHILS NFR BLD: 79.9 % (ref 44–72)
NRBC # BLD AUTO: 0 K/UL
NRBC BLD-RTO: 0 /100 WBC
PLATELET # BLD AUTO: 168 K/UL (ref 164–446)
PMV BLD AUTO: 10.1 FL (ref 9–12.9)
POTASSIUM SERPL-SCNC: 4 MMOL/L (ref 3.6–5.5)
RBC # BLD AUTO: 3.7 M/UL (ref 4.7–6.1)
SODIUM SERPL-SCNC: 131 MMOL/L (ref 135–145)
TSH SERPL DL<=0.005 MIU/L-ACNC: 1.39 UIU/ML (ref 0.38–5.33)
VIT B12 SERPL-MCNC: 1256 PG/ML (ref 211–911)
WBC # BLD AUTO: 9.2 K/UL (ref 4.8–10.8)

## 2018-02-21 PROCEDURE — 36415 COLL VENOUS BLD VENIPUNCTURE: CPT

## 2018-02-21 PROCEDURE — 770006 HCHG ROOM/CARE - MED/SURG/GYN SEMI*

## 2018-02-21 PROCEDURE — 99407 BEHAV CHNG SMOKING > 10 MIN: CPT

## 2018-02-21 PROCEDURE — 700102 HCHG RX REV CODE 250 W/ 637 OVERRIDE(OP): Performed by: HOSPITALIST

## 2018-02-21 PROCEDURE — 80048 BASIC METABOLIC PNL TOTAL CA: CPT

## 2018-02-21 PROCEDURE — 700102 HCHG RX REV CODE 250 W/ 637 OVERRIDE(OP): Performed by: INTERNAL MEDICINE

## 2018-02-21 PROCEDURE — G8979 MOBILITY GOAL STATUS: HCPCS | Mod: CJ

## 2018-02-21 PROCEDURE — A9270 NON-COVERED ITEM OR SERVICE: HCPCS | Performed by: INTERNAL MEDICINE

## 2018-02-21 PROCEDURE — 97166 OT EVAL MOD COMPLEX 45 MIN: CPT

## 2018-02-21 PROCEDURE — 82607 VITAMIN B-12: CPT

## 2018-02-21 PROCEDURE — G8988 SELF CARE GOAL STATUS: HCPCS | Mod: CJ

## 2018-02-21 PROCEDURE — 82306 VITAMIN D 25 HYDROXY: CPT

## 2018-02-21 PROCEDURE — G8987 SELF CARE CURRENT STATUS: HCPCS | Mod: CL

## 2018-02-21 PROCEDURE — 83880 ASSAY OF NATRIURETIC PEPTIDE: CPT

## 2018-02-21 PROCEDURE — 700105 HCHG RX REV CODE 258: Performed by: INTERNAL MEDICINE

## 2018-02-21 PROCEDURE — 700111 HCHG RX REV CODE 636 W/ 250 OVERRIDE (IP): Performed by: ORTHOPAEDIC SURGERY

## 2018-02-21 PROCEDURE — 97163 PT EVAL HIGH COMPLEX 45 MIN: CPT

## 2018-02-21 PROCEDURE — 99233 SBSQ HOSP IP/OBS HIGH 50: CPT | Performed by: FAMILY MEDICINE

## 2018-02-21 PROCEDURE — A9270 NON-COVERED ITEM OR SERVICE: HCPCS | Performed by: HOSPITALIST

## 2018-02-21 PROCEDURE — G8978 MOBILITY CURRENT STATUS: HCPCS | Mod: CM

## 2018-02-21 PROCEDURE — 92526 ORAL FUNCTION THERAPY: CPT

## 2018-02-21 PROCEDURE — 85025 COMPLETE CBC W/AUTO DIFF WBC: CPT

## 2018-02-21 PROCEDURE — 84443 ASSAY THYROID STIM HORMONE: CPT

## 2018-02-21 RX ORDER — LEVETIRACETAM 100 MG/ML
1000 SOLUTION ORAL 2 TIMES DAILY
Status: DISCONTINUED | OUTPATIENT
Start: 2018-02-21 | End: 2018-02-24 | Stop reason: HOSPADM

## 2018-02-21 RX ORDER — LEVETIRACETAM 100 MG/ML
2000 SOLUTION ORAL EVERY MORNING
Status: DISCONTINUED | OUTPATIENT
Start: 2018-02-21 | End: 2018-02-24 | Stop reason: HOSPADM

## 2018-02-21 RX ADMIN — Medication 500 MCG: at 09:04

## 2018-02-21 RX ADMIN — OXYCODONE HYDROCHLORIDE 2.5 MG: 5 TABLET ORAL at 19:58

## 2018-02-21 RX ADMIN — STANDARDIZED SENNA CONCENTRATE AND DOCUSATE SODIUM 2 TABLET: 8.6; 5 TABLET, FILM COATED ORAL at 19:58

## 2018-02-21 RX ADMIN — LEVETIRACETAM 1000 MG: 100 SOLUTION ORAL at 17:33

## 2018-02-21 RX ADMIN — VITAMIN D, TAB 1000IU (100/BT) 5000 UNITS: 25 TAB at 09:04

## 2018-02-21 RX ADMIN — Medication 500 MG: at 09:04

## 2018-02-21 RX ADMIN — ATORVASTATIN CALCIUM 10 MG: 10 TABLET ORAL at 19:58

## 2018-02-21 RX ADMIN — OMEPRAZOLE 20 MG: 20 CAPSULE, DELAYED RELEASE ORAL at 09:04

## 2018-02-21 RX ADMIN — Medication 500 MG: at 17:30

## 2018-02-21 RX ADMIN — STANDARDIZED SENNA CONCENTRATE AND DOCUSATE SODIUM 2 TABLET: 8.6; 5 TABLET, FILM COATED ORAL at 09:04

## 2018-02-21 RX ADMIN — SODIUM CHLORIDE: 9 INJECTION, SOLUTION INTRAVENOUS at 09:04

## 2018-02-21 RX ADMIN — LEVETIRACETAM 2000 MG: 100 SOLUTION ORAL at 09:05

## 2018-02-21 RX ADMIN — LEVETIRACETAM 1000 MG: 100 SOLUTION ORAL at 12:52

## 2018-02-21 RX ADMIN — CEFAZOLIN SODIUM 2 G: 2 INJECTION, SOLUTION INTRAVENOUS at 00:55

## 2018-02-21 RX ADMIN — TAMSULOSIN HYDROCHLORIDE 0.4 MG: 0.4 CAPSULE ORAL at 09:04

## 2018-02-21 RX ADMIN — BACLOFEN 5 MG: 10 TABLET ORAL at 19:57

## 2018-02-21 RX ADMIN — BACLOFEN 5 MG: 10 TABLET ORAL at 09:04

## 2018-02-21 RX ADMIN — OXYCODONE HYDROCHLORIDE 2.5 MG: 5 TABLET ORAL at 12:18

## 2018-02-21 ASSESSMENT — COGNITIVE AND FUNCTIONAL STATUS - GENERAL
MOVING TO AND FROM BED TO CHAIR: A LOT
SUGGESTED CMS G CODE MODIFIER DAILY ACTIVITY: CL
WALKING IN HOSPITAL ROOM: TOTAL
TOILETING: A LOT
EATING MEALS: A LITTLE
PERSONAL GROOMING: A LITTLE
STANDING UP FROM CHAIR USING ARMS: A LOT
TURNING FROM BACK TO SIDE WHILE IN FLAT BAD: UNABLE
CLIMB 3 TO 5 STEPS WITH RAILING: TOTAL
MOVING FROM LYING ON BACK TO SITTING ON SIDE OF FLAT BED: UNABLE
DRESSING REGULAR LOWER BODY CLOTHING: TOTAL
DRESSING REGULAR UPPER BODY CLOTHING: A LOT
MOBILITY SCORE: 8
SUGGESTED CMS G CODE MODIFIER MOBILITY: CM
DAILY ACTIVITIY SCORE: 13
HELP NEEDED FOR BATHING: A LOT

## 2018-02-21 ASSESSMENT — ACTIVITIES OF DAILY LIVING (ADL): TOILETING: REQUIRES ASSIST

## 2018-02-21 ASSESSMENT — GAIT ASSESSMENTS: GAIT LEVEL OF ASSIST: UNABLE TO PARTICIPATE

## 2018-02-21 ASSESSMENT — PAIN SCALES - GENERAL
PAINLEVEL_OUTOF10: 1
PAINLEVEL_OUTOF10: 4

## 2018-02-21 NOTE — DISCHARGE PLANNING
Medical Social Work    MICHOACANO spoke with Jeromy regarding SNF choice - pt is a long term care pt at [facility].  Jeromy approved pt going back to [place] but would like a referral sent to [facility] to see if they will take him long term care.  Jeromy also gave this SW a contact number for [facility] phone number [phone] which is not accepting phone calls at this time.      MICHOACANO remains available for DC planning and support.

## 2018-02-21 NOTE — THERAPY
"Speech Language Therapy dysphagia treatment completed.   Functional Status:  Patient seen this date for dysphagia tx session s/p downgrade to NTFL diet yesterday. Patient seen with NTFL lunch tray with direct feeding from this SLP. Patient consumed approximately 50% of lunch tray. Patient had slight wet/gurgly vocal quality x2 on NTL, but was instructed to utilize two effortful swallows and self-monitor vocal quality each bite/sip. Patient had no other overt s/sx of aspiration during entire tx session using these strategies. RN in room to give patient meds crushed in pudding and patient consumed these without difficulty. Laryngeal elevation palpated as weak. Initiation of swallow trigger was slightly delayed, but functional. At this time, recommend patient continue NTFL diet with strict 1;1 feeding and strict use of swallow precautions, posted HOB. Consider supplements d/t reduced PO intake. RN aware. SLP is following closely.     Recommendations:  At this time, recommend patient continue NTFL diet with strict 1;1 feeding and strict use of swallow precautions, posted HOB. Consider supplements d/t reduced PO intake.  Plan of Care: Will benefit from Speech Therapy 3 times per week  Post-Acute Therapy: Discharge to a transitional care facility for continued skilled therapy services.    See \"Rehab Therapy-Acute\" Patient Summary Report for complete documentation.     "

## 2018-02-21 NOTE — PROGRESS NOTES
"Assessment complete. Pt is AAO x3 - not oriented to time. Pain described as a \"1\" on the 0-10 scale. VS WNL. IV d/c'd due to infiltration with moderate swelling in R forearm. Heat packs applied. Pt receiving 2L O2 via NC and coughing white opaque sputum. Pt has been educated to self suction. Q2 hour turns implemented. POC discussed. All needs met at this time. Bed in lowest position, call light within reach, rounding in place.   "

## 2018-02-21 NOTE — CARE PLAN
Problem: Safety  Goal: Will remain free from falls    Intervention: Implement fall precautions  Bed is in the lowest position, call light and belongings are within reach, treaded socks are on, DME is in the bathroom, bed alarm is on, rails are up, and hourly rounding is in place.       Problem: Pain Management  Goal: Pain level will decrease to patient's comfort goal    Intervention: Follow pain managment plan developed in collaboration with patient and Interdisciplinary Team  Pt continues to report no pain and refuses pain medication. Polar ice is in place to help with swelling and discomfort.

## 2018-02-21 NOTE — THERAPY
"Physical Therapy Evaluation completed.   Bed Mobility:  Supine to Sit: Maximal Assist (x2)  Transfers: Sit to Stand:  (x2)  Gait: Level Of Assist: Unable to Participate with Will Continue to Assess for Equipment Needs       Plan of Care: Will benefit from Physical Therapy 5 times per week  Discharge Recommendations: Equipment: Will Continue to Assess for Equipment Needs. Post-acute therapy Discharge to a transitional care facility for continued skilled therapy services.    See \"Rehab Therapy-Acute\" Patient Summary Report for complete documentation.       Pt. presents with decreased bed mobility and decreased sitting and standing balance limiting functional mobility. Pt. requires maxA x2 to go from supine<>EOB and for stand pivot transfers.  During EOB sitting, pt presents with posterior and L lateran lean and requires verbal cueing to correct trunk to midline as well as physical assist intermittently.  Pt. able to follow 1 step commands and noted strength in bilateral LE.  Pt. with tone in bilateral feet and  cognition impairments  which appears to be his baseline.  Pt. very pleasant and cooperative during session and appears engaged in activities.  Pt. will benefit from skilled acute PT services to address deficits.  Pt required 24hr care at SNF as baseline and, at this time, no barriers present to prevent pt from returning to prior living facility to continue physical therapy.  "

## 2018-02-21 NOTE — PROGRESS NOTES
[facility] Hospitalist Progress Note    Date of Service: 2018    Chief Complaint  62 y.o. male admitted 2018 with Femoral neck fracture    Interval Problem Update  Femoral fx - surgery done today  Hypoxia -remains on O2  Dysphagia - swallow eval done  Enceph - unknown baseline    Consultants/Specialty  Ortho - [facility]    Disposition  TBD        Review of Systems   Unable to perform ROS: Medical condition      Physical Exam  Laboratory/Imaging   Hemodynamics  Temp (24hrs), Av.3 °C (97.4 °F), Min:36 °C (96.8 °F), Max:37 °C (98.6 °F)   Temperature: 36.1 °C (97 °F)  Pulse  Av.5  Min: 57  Max: 104 Heart Rate (Monitored): 80  Blood Pressure: 133/89, NIBP: 126/97      Respiratory      Respiration: 18, Pulse Oximetry: 95 %     Work Of Breathing / Effort: Shallow  RUL Breath Sounds: Clear, RML Breath Sounds: Clear, RLL Breath Sounds: Diminished, JAVIER Breath Sounds: Clear, LLL Breath Sounds: Diminished    Fluids    Intake/Output Summary (Last 24 hours) at 18 1844  Last data filed at 18 1700   Gross per 24 hour   Intake             2350 ml   Output             2700 ml   Net             -350 ml       Nutrition  Orders Placed This Encounter   Procedures   • DIET ORDER     Standing Status:   Standing     Number of Occurrences:   1     Order Specific Question:   Diet:     Answer:   Full Liquid [11]     Order Specific Question:   Consistency/Fluid modifications:     Answer:   Nectar Thick [2]     Order Specific Question:   Miscellaneous modifications:     Answer:   SLP - 1:1 Supervision by Nursing [21]     Physical Exam   Constitutional: He appears well-developed and well-nourished.   HENT:   Head: Normocephalic.   Eyes: Conjunctivae are normal. Pupils are equal, round, and reactive to light.   Neck: No tracheal deviation present. No thyromegaly present.   Cardiovascular: Normal rate and regular rhythm.    Pulmonary/Chest: Effort normal and breath sounds normal.   Abdominal: Soft. Bowel sounds are normal.  He exhibits no distension. There is no tenderness.   Musculoskeletal: He exhibits edema.   Lymphadenopathy:     He has no cervical adenopathy.   Neurological:   Drowsy, follows simple commands   Skin: Skin is warm and dry.   Nursing note and vitals reviewed.      Recent Labs      02/18/18 0350 02/19/18 0250 02/20/18   0431   WBC  6.1  6.0  6.0   RBC  4.08*  3.96*  4.24*   HEMOGLOBIN  12.7*  12.1*  13.2*   HEMATOCRIT  36.5*  35.9*  38.5*   MCV  89.5  90.7  90.8   MCH  31.1  30.6  31.1   MCHC  34.8  33.7  34.3   RDW  48.5  49.8  49.7   PLATELETCT  166  170  167   MPV  10.3  10.0  10.0     Recent Labs      02/18/18 0350 02/19/18 0250 02/20/18   0431   SODIUM  131*  132*  131*   POTASSIUM  3.6  3.8  3.7   CHLORIDE  99  101  99   CO2  24  26  22   GLUCOSE  79  85  80   BUN  15  15  11   CREATININE  0.69  0.73  0.58   CALCIUM  9.0  8.4*  8.9     Recent Labs      02/19/18   0250   APTT  38.6*   INR  1.19*                  Assessment/Plan     * Femoral neck fracture (CMS-HCC)- (present on admission)   Assessment & Plan    s/p Hip hemiarthoplasty        Acute encephalopathy- (present on admission)   Assessment & Plan    multifactorial        Hypoxia- (present on admission)   Assessment & Plan    Keep O2 sats above 90%  Encourage IS        Dysphagia- (present on admission)   Assessment & Plan    NTL  SLP to follow        Hyponatremia- (present on admission)   Assessment & Plan    IVF NS, follow bmp        H/O traumatic brain injury- (present on admission)   Assessment & Plan    Keppra        History of DVT (deep vein thrombosis)- (present on admission)   Assessment & Plan    Holding Xarelto         BPH (benign prostatic hyperplasia)- (present on admission)   Assessment & Plan    Flomax          Quality-Core Measures   Reviewed items::  Radiology images reviewed, EKG reviewed, Labs reviewed and Medications reviewed  Sam catheter::  No Sam  DVT prophylaxis - mechanical:  SCDs  Ulcer Prophylaxis::  No

## 2018-02-21 NOTE — PROGRESS NOTES
Pt returned to room from PACU at ~1215. VSS, on 2L o2 with  satting mid 90s. Pt denies any pain. Dressing c/d/i. Ice pack in place. Pt left unit briefly for Xray. When returned to room polar machine placed. SCDs on. IVF reconnected, abx given as scheduled. Bladder scan obtained first showed >1000 in bladder. Condom cath placement checked and found to be kinked. Condom cath readjusted and urine flowing w/o issue. Bladder scan reordered to reevaluate and second scan showed <200 retention. No need to straight cath at this time. Pt continued to deny pain throughout remainder of shift. Small amount of sanguinous drainage noted on L hip bandage, does not appear to be actively bleeding. Speech eval completed, however pt having wet cough and appeared to have trouble swallowing and has been downgraded to thicken liquids only with 1:1 feed assistance. Hourly rounding and falls precautions in place. Pt reeducated on calling for assistance and demonstrated understanding.

## 2018-02-21 NOTE — PROGRESS NOTES
[facility] Hospitalist Progress Note    Date of Service: 2018    Chief Complaint  62 y.o. 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     Interval Problem Update  Femoral fx - pain controlled  Hypoxia -remains on O2  Enceph - unknown baseline  R arm swelling - noted today    Consultants/Specialty  Ortho - [place]    Disposition  SNF        Review of Systems   Unable to perform ROS: Medical condition      Physical Exam  Laboratory/Imaging   Hemodynamics  Temp (24hrs), Av.2 °C (97.2 °F), Min:36 °C (96.8 °F), Max:37.1 °C (98.7 °F)   Temperature: 37.1 °C (98.7 °F)  Pulse  Av.4  Min: 57  Max: 104    Blood Pressure: 109/72      Respiratory      Respiration: 16, Pulse Oximetry: 96 %     Work Of Breathing / Effort: Mild  RUL Breath Sounds: Clear, RML Breath Sounds: Clear, RLL Breath Sounds: Diminished, JAVIER Breath Sounds: Clear, LLL Breath Sounds: Diminished    Fluids    Intake/Output Summary (Last 24 hours) at 18 1122  Last data filed at 18 1700   Gross per 24 hour   Intake              250 ml   Output             1100 ml   Net             -850 ml       Nutrition  Orders Placed This Encounter   Procedures   • DIET ORDER     Standing Status:   Standing     Number of Occurrences:   1     Order Specific Question:   Diet:     Answer:   Full Liquid [11]     Order Specific Question:   Consistency/Fluid modifications:     Answer:   Nectar Thick [2]     Order Specific Question:   Miscellaneous modifications:     Answer:   SLP - 1:1 Supervision by Nursing [21]     Physical Exam   Constitutional: He appears well-developed and well-nourished.   HENT:   Head: Normocephalic.   Eyes: Conjunctivae are normal. Pupils are equal, round, and reactive to light.   Neck: No tracheal deviation present. No thyromegaly present.   Cardiovascular: Normal rate and regular rhythm.    Pulmonary/Chest: Effort normal and breath sounds normal.    Abdominal: Soft. Bowel sounds are normal. He exhibits no distension. There is no tenderness.   Musculoskeletal: He exhibits edema.   Right upper extremity swelling   Lymphadenopathy:     He has no cervical adenopathy.   Neurological: He is alert.   Oriented to self  Follows commands   Skin: Skin is warm and dry.   Nursing note and vitals reviewed.      Recent Labs      02/19/18 0250 02/20/18   0431  02/21/18   0452   WBC  6.0  6.0  9.2   RBC  3.96*  4.24*  3.70*   HEMOGLOBIN  12.1*  13.2*  11.4*   HEMATOCRIT  35.9*  38.5*  33.1*   MCV  90.7  90.8  89.5   MCH  30.6  31.1  30.8   MCHC  33.7  34.3  34.4   RDW  49.8  49.7  46.3   PLATELETCT  170  167  168   MPV  10.0  10.0  10.1     Recent Labs      02/19/18   0250 02/20/18   0431  02/21/18   0452   SODIUM  132*  131*  131*   POTASSIUM  3.8  3.7  4.0   CHLORIDE  101  99  100   CO2  26  22  24   GLUCOSE  85  80  119*   BUN  15  11  18   CREATININE  0.73  0.58  0.56   CALCIUM  8.4*  8.9  9.1     Recent Labs      02/19/18   0250   APTT  38.6*   INR  1.19*     Recent Labs      02/21/18   0452   BNPBTYPENAT  100              Assessment/Plan     * Femoral neck fracture (CMS-HCC)- (present on admission)   Assessment & Plan    s/p Hip hemiarthoplasty        Acute encephalopathy- (present on admission)   Assessment & Plan    multifactorial        Hypoxia- (present on admission)   Assessment & Plan    Keep O2 sats above 90%  Encourage IS        Swelling of right upper extremity   Assessment & Plan    Check venous duplex        Femoral neck stress fracture, with nonunion, subsequent encounter- (present on admission)   Assessment & Plan    Right femoral neck, chronic, treated nonsurgically           Vitamin D deficiency- (present on admission)   Assessment & Plan    replacement        Dysphagia- (present on admission)   Assessment & Plan    NTL  SLP to follow        Hyponatremia- (present on admission)   Assessment & Plan    Stop IVF NS, follow bmp        H/O traumatic brain  injury- (present on admission)   Assessment & Plan    Keppra        History of DVT (deep vein thrombosis)- (present on admission)   Assessment & Plan    resume Xarelto?         BPH (benign prostatic hyperplasia)- (present on admission)   Assessment & Plan    Flomax          Quality-Core Measures   Reviewed items::  Radiology images reviewed, EKG reviewed, Labs reviewed and Medications reviewed  Sam catheter::  No Sam  DVT prophylaxis - mechanical:  SCDs  Ulcer Prophylaxis::  No

## 2018-02-21 NOTE — THERAPY
"Occupational Therapy Evaluation completed.   Functional Status:  Pt up on the BSC with Nsg on arrival.  Pt required Max A with personal hygiene.  Pt has ext tone in his RLE with standing.  Pt was Max A transfer back to bed.  Pt unable to stand fully upright despite multiple efforts.  Pt able to follow simple commands with a delay.  Pt currently requires Mod A for UB dressing & Max A for LB dressing.  Plan of Care: Will benefit from Occupational Therapy 3 times per week  Discharge Recommendations:  Equipment: Will Continue to Assess for Equipment Needs. Post-acute therapy Discharge to a transitional care facility for continued skilled therapy services.    See \"Rehab Therapy-Acute\" Patient Summary Report for complete documentation.    "

## 2018-02-21 NOTE — PROGRESS NOTES
62yoM with TBI and right femoral neck nonunion.  Has acute left displaced femoral neck fx s/p hemiarthroplasty 2/20.    S: Says no pain.    O:    Vitals:    02/20/18 1628 02/20/18 2000 02/21/18 0000 02/21/18 0400   BP: 133/89 105/70 104/78 101/65   Pulse: 89 71 61 80   Resp: 18 16 16 16   Temp: 36.1 °C (97 °F) 36.2 °C (97.1 °F) 36.1 °C (96.9 °F) 36.2 °C (97.1 °F)   SpO2: 95% 98% 97% 96%   Weight:       Height:         Exam:  General-NAD, alert and following commands  LLE-hip dressing c/d/i, flexing/extending toes and dorsi/plantarflexing foot    Hct: 33.1    A: 62yoM with TBI and right femoral neck nonunion.  Has acute left displaced femoral neck fx s/p hemiarthroplasty 2/20.    Recs;  --WBAT LLE  --posterior hip precautions  --PT/OT   --SNF placement  --fu 2 weeks postop

## 2018-02-21 NOTE — CARE PLAN
Problem: Respiratory:  Goal: Respiratory status will improve  Outcome: PROGRESSING AS EXPECTED  Denies SOB. Cont on 2L with  sattin mid 90s.     Problem: Urinary Elimination:  Goal: Ability to reestablish a normal urinary elimination pattern will improve  Outcome: PROGRESSING AS EXPECTED  Voiding w/o issue. Post surgical bladder scan showed >1000. Condom cath found to be kinked, once readjusted urine flowing w/o issue. Bladderscan completed again <200. Continued to monitor good urine output throughout remainder of shift.

## 2018-02-21 NOTE — RESPIRATORY CARE
"  COPD EDUCATION by COPD CLINICAL EDUCATOR  (Phone: [phone])  2/21/2018 at 10:42 AM by Pili Martinez     Patient was interviewed by Respiratory Education team for COPD program. Patient refused COPD program. Information packet about lung disease and its treatments given to patient.  Also, provided our smoking cessation packet with \"Tips to Quit\" and flyer for \"Free Smoking Cessation Classes\". Provided \"Quit Card\" with the phone number to [employer] [facility] for free nicotine replacement therapy.    "

## 2018-02-21 NOTE — DISCHARGE PLANNING
TCN met with patient at bedside to discuss SNF choice. Patient not oriented to make choice, requested TCN contact Step dad. Step dad number not listed, TCN attempted to contact patient brother but unable to reach. SW aware of attempts. SW to reattempt contacting family for choice. TCN to follow as needed.

## 2018-02-22 LAB
ANION GAP SERPL CALC-SCNC: 7 MMOL/L (ref 0–11.9)
BASOPHILS # BLD AUTO: 0.6 % (ref 0–1.8)
BASOPHILS # BLD: 0.04 K/UL (ref 0–0.12)
BNP SERPL-MCNC: 112 PG/ML (ref 0–100)
BUN SERPL-MCNC: 15 MG/DL (ref 8–22)
CALCIUM SERPL-MCNC: 8.8 MG/DL (ref 8.5–10.5)
CHLORIDE SERPL-SCNC: 99 MMOL/L (ref 96–112)
CO2 SERPL-SCNC: 26 MMOL/L (ref 20–33)
CREAT SERPL-MCNC: 0.6 MG/DL (ref 0.5–1.4)
EOSINOPHIL # BLD AUTO: 0.15 K/UL (ref 0–0.51)
EOSINOPHIL NFR BLD: 2.2 % (ref 0–6.9)
ERYTHROCYTE [DISTWIDTH] IN BLOOD BY AUTOMATED COUNT: 48 FL (ref 35.9–50)
GLUCOSE SERPL-MCNC: 99 MG/DL (ref 65–99)
HCT VFR BLD AUTO: 34.2 % (ref 42–52)
HGB BLD-MCNC: 11.4 G/DL (ref 14–18)
IMM GRANULOCYTES # BLD AUTO: 0.05 K/UL (ref 0–0.11)
IMM GRANULOCYTES NFR BLD AUTO: 0.7 % (ref 0–0.9)
LYMPHOCYTES # BLD AUTO: 1 K/UL (ref 1–4.8)
LYMPHOCYTES NFR BLD: 15 % (ref 22–41)
MCH RBC QN AUTO: 29.8 PG (ref 27–33)
MCHC RBC AUTO-ENTMCNC: 33.3 G/DL (ref 33.7–35.3)
MCV RBC AUTO: 89.5 FL (ref 81.4–97.8)
MONOCYTES # BLD AUTO: 0.87 K/UL (ref 0–0.85)
MONOCYTES NFR BLD AUTO: 13 % (ref 0–13.4)
NEUTROPHILS # BLD AUTO: 4.56 K/UL (ref 1.82–7.42)
NEUTROPHILS NFR BLD: 68.5 % (ref 44–72)
NRBC # BLD AUTO: 0 K/UL
NRBC BLD-RTO: 0 /100 WBC
PLATELET # BLD AUTO: 243 K/UL (ref 164–446)
PMV BLD AUTO: 10.3 FL (ref 9–12.9)
POTASSIUM SERPL-SCNC: 3.7 MMOL/L (ref 3.6–5.5)
RBC # BLD AUTO: 3.82 M/UL (ref 4.7–6.1)
SODIUM SERPL-SCNC: 132 MMOL/L (ref 135–145)
WBC # BLD AUTO: 6.7 K/UL (ref 4.8–10.8)

## 2018-02-22 PROCEDURE — 80048 BASIC METABOLIC PNL TOTAL CA: CPT

## 2018-02-22 PROCEDURE — 85025 COMPLETE CBC W/AUTO DIFF WBC: CPT

## 2018-02-22 PROCEDURE — 83880 ASSAY OF NATRIURETIC PEPTIDE: CPT

## 2018-02-22 PROCEDURE — A9270 NON-COVERED ITEM OR SERVICE: HCPCS | Performed by: HOSPITALIST

## 2018-02-22 PROCEDURE — A9270 NON-COVERED ITEM OR SERVICE: HCPCS | Performed by: INTERNAL MEDICINE

## 2018-02-22 PROCEDURE — 700102 HCHG RX REV CODE 250 W/ 637 OVERRIDE(OP): Performed by: HOSPITALIST

## 2018-02-22 PROCEDURE — 770006 HCHG ROOM/CARE - MED/SURG/GYN SEMI*

## 2018-02-22 PROCEDURE — 99232 SBSQ HOSP IP/OBS MODERATE 35: CPT | Performed by: FAMILY MEDICINE

## 2018-02-22 PROCEDURE — 36415 COLL VENOUS BLD VENIPUNCTURE: CPT

## 2018-02-22 PROCEDURE — 700102 HCHG RX REV CODE 250 W/ 637 OVERRIDE(OP): Performed by: INTERNAL MEDICINE

## 2018-02-22 PROCEDURE — 93971 EXTREMITY STUDY: CPT | Mod: 26,GZ | Performed by: SURGERY

## 2018-02-22 PROCEDURE — 93971 EXTREMITY STUDY: CPT

## 2018-02-22 RX ADMIN — ATORVASTATIN CALCIUM 10 MG: 10 TABLET ORAL at 23:24

## 2018-02-22 RX ADMIN — BACLOFEN 5 MG: 10 TABLET ORAL at 23:23

## 2018-02-22 RX ADMIN — VITAMIN D, TAB 1000IU (100/BT) 5000 UNITS: 25 TAB at 09:29

## 2018-02-22 RX ADMIN — Medication 500 MG: at 17:10

## 2018-02-22 RX ADMIN — STANDARDIZED SENNA CONCENTRATE AND DOCUSATE SODIUM 2 TABLET: 8.6; 5 TABLET, FILM COATED ORAL at 09:29

## 2018-02-22 RX ADMIN — TAMSULOSIN HYDROCHLORIDE 0.4 MG: 0.4 CAPSULE ORAL at 09:29

## 2018-02-22 RX ADMIN — Medication 500 MCG: at 09:29

## 2018-02-22 RX ADMIN — OXYCODONE HYDROCHLORIDE 2.5 MG: 5 TABLET ORAL at 17:36

## 2018-02-22 RX ADMIN — LEVETIRACETAM 2000 MG: 100 SOLUTION ORAL at 09:29

## 2018-02-22 RX ADMIN — BACLOFEN 5 MG: 10 TABLET ORAL at 09:29

## 2018-02-22 RX ADMIN — LEVETIRACETAM 1000 MG: 100 SOLUTION ORAL at 17:10

## 2018-02-22 RX ADMIN — Medication 500 MG: at 09:29

## 2018-02-22 ASSESSMENT — PAIN SCALES - GENERAL
PAINLEVEL_OUTOF10: 2
PAINLEVEL_OUTOF10: 2

## 2018-02-22 NOTE — CARE PLAN
Problem: Safety  Goal: Will remain free from falls    Intervention: Implement fall precautions  Bed is in the lowest position, call light and belongings are within reach, treaded socks are on, DME is in the bathroom, bed alarm is on, rails are up, and hourly rounding is in place.

## 2018-02-22 NOTE — PROGRESS NOTES
[facility] Hospitalist Progress Note    Date of Service: 2018    Chief Complaint  62 y.o. 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     Interval Problem Update  Femoral fx - pain controlled  Hypoxia -remains on O2  Enceph - unknown baseline  R arm swelling - venous duplex pending    Consultants/Specialty  Ortho - [place]    Disposition  SNF        Review of Systems   Unable to perform ROS: Medical condition      Physical Exam  Laboratory/Imaging   Hemodynamics  Temp (24hrs), Av.3 °C (97.3 °F), Min:35.9 °C (96.7 °F), Max:36.8 °C (98.3 °F)   Temperature: 36.8 °C (98.3 °F)  Pulse  Av.9  Min: 57  Max: 104    Blood Pressure: 112/75      Respiratory      Respiration: 12, Pulse Oximetry: 93 %     Work Of Breathing / Effort: Mild  RUL Breath Sounds: Clear, RML Breath Sounds: Diminished, RLL Breath Sounds: Diminished, JAVIER Breath Sounds: Clear, LLL Breath Sounds: Diminished    Fluids    Intake/Output Summary (Last 24 hours) at 18 1153  Last data filed at 18 0400   Gross per 24 hour   Intake                0 ml   Output             1000 ml   Net            -1000 ml       Nutrition  Orders Placed This Encounter   Procedures   • DIET ORDER     Standing Status:   Standing     Number of Occurrences:   1     Order Specific Question:   Diet:     Answer:   Full Liquid [11]     Order Specific Question:   Consistency/Fluid modifications:     Answer:   Nectar Thick [2]     Order Specific Question:   Miscellaneous modifications:     Answer:   SLP - 1:1 Supervision by Nursing [21]     Physical Exam   Constitutional: He appears well-developed and well-nourished.   HENT:   Head: Normocephalic.   Eyes: Conjunctivae are normal. Pupils are equal, round, and reactive to light.   Neck: No tracheal deviation present. No thyromegaly present.   Cardiovascular: Normal rate and regular rhythm.    Pulmonary/Chest: Effort normal and breath  sounds normal.   Abdominal: Soft. Bowel sounds are normal. He exhibits no distension. There is no tenderness.   Musculoskeletal: He exhibits edema.   Right upper extremity swelling   Lymphadenopathy:     He has no cervical adenopathy.   Neurological: He is alert.   Oriented to self  Follows commands   Skin: Skin is warm and dry.   Nursing note and vitals reviewed.      Recent Labs      02/20/18 0431 02/21/18 0452  02/22/18   0408   WBC  6.0  9.2  6.7   RBC  4.24*  3.70*  3.82*   HEMOGLOBIN  13.2*  11.4*  11.4*   HEMATOCRIT  38.5*  33.1*  34.2*   MCV  90.8  89.5  89.5   MCH  31.1  30.8  29.8   MCHC  34.3  34.4  33.3*   RDW  49.7  46.3  48.0   PLATELETCT  167  168  243   MPV  10.0  10.1  10.3     Recent Labs      02/20/18   0431 02/21/18   0452  02/22/18   0408   SODIUM  131*  131*  132*   POTASSIUM  3.7  4.0  3.7   CHLORIDE  99  100  99   CO2  22  24  26   GLUCOSE  80  119*  99   BUN  11  18  15   CREATININE  0.58  0.56  0.60   CALCIUM  8.9  9.1  8.8         Recent Labs      02/21/18 0452 02/22/18   0408   BNPBTYPENAT  100  112*              Assessment/Plan     * Femoral neck fracture (CMS-HCC)- (present on admission)   Assessment & Plan    s/p Hip hemiarthoplasty        Acute encephalopathy- (present on admission)   Assessment & Plan    multifactorial        Hypoxia- (present on admission)   Assessment & Plan    Keep O2 sats above 90%  Encourage IS        Swelling of right upper extremity   Assessment & Plan    venous duplex pending        Femoral neck stress fracture, with nonunion, subsequent encounter- (present on admission)   Assessment & Plan    Right femoral neck, chronic, treated nonsurgically           Vitamin D deficiency- (present on admission)   Assessment & Plan    replacement        Dysphagia- (present on admission)   Assessment & Plan    NTL  SLP to follow        Hyponatremia- (present on admission)   Assessment & Plan    follow bmp        H/O traumatic brain injury- (present on admission)    Assessment & Plan    Keppra        History of DVT (deep vein thrombosis)- (present on admission)   Assessment & Plan    resume Xarelto?         BPH (benign prostatic hyperplasia)- (present on admission)   Assessment & Plan    Flomax          Quality-Core Measures   Reviewed items::  Radiology images reviewed, EKG reviewed, Labs reviewed and Medications reviewed  Sam catheter::  No Asm  DVT prophylaxis - mechanical:  SCDs  Ulcer Prophylaxis::  No

## 2018-02-22 NOTE — DISCHARGE PLANNING
Per RN, pt is not medically cleared for SNF as he is pending a Venous Duplex Study. SW will continue to follow.

## 2018-02-22 NOTE — CARE PLAN
Problem: Communication  Goal: The ability to communicate needs accurately and effectively will improve    Intervention: Educate patient and significant other/support system about the plan of care, procedures, treatments, medications and allow for questions  POC discussed with pt including medications and unit routine.       Problem: Skin Integrity  Goal: Risk for impaired skin integrity will decrease    Intervention: Implement precautions to protect skin integrity in collaboration with the interdisciplinary team  q2hr turning in place, waffle mattress overlay in place, and pillows in use to float heels.

## 2018-02-22 NOTE — CARE PLAN
Problem: Mobility  Goal: Risk for activity intolerance will decrease    Intervention: Encourage patient to increase activity level in collaboration with Interdisciplinary Team  Got pt into chair today. PT/OT also worked with pt today.       Problem: Skin Integrity  Goal: Risk for impaired skin integrity will decrease    Intervention: Implement precautions to protect skin integrity in collaboration with the interdisciplinary team  Waffle mattress overlay in place and q2hr turning in place as well.

## 2018-02-22 NOTE — CARE PLAN
Problem: Skin Integrity  Goal: Risk for impaired skin integrity will decrease    Intervention: Implement precautions to protect skin integrity in collaboration with the interdisciplinary team  Pt is immobile after TBI and deemed a maximum assist. Q 2 turns in place due to redness of sacrum. Skin is still blanchable without signs of breakdown.

## 2018-02-23 ENCOUNTER — APPOINTMENT (OUTPATIENT)
Dept: RADIOLOGY | Facility: MEDICAL CENTER | Age: 63
DRG: 469 | End: 2018-02-23
Attending: FAMILY MEDICINE
Payer: MEDICARE

## 2018-02-23 LAB
ANION GAP SERPL CALC-SCNC: 7 MMOL/L (ref 0–11.9)
BASOPHILS # BLD AUTO: 0.6 % (ref 0–1.8)
BASOPHILS # BLD: 0.03 K/UL (ref 0–0.12)
BUN SERPL-MCNC: 16 MG/DL (ref 8–22)
CALCIUM SERPL-MCNC: 8.6 MG/DL (ref 8.5–10.5)
CHLORIDE SERPL-SCNC: 98 MMOL/L (ref 96–112)
CO2 SERPL-SCNC: 26 MMOL/L (ref 20–33)
CREAT SERPL-MCNC: 0.5 MG/DL (ref 0.5–1.4)
EOSINOPHIL # BLD AUTO: 0.09 K/UL (ref 0–0.51)
EOSINOPHIL NFR BLD: 1.8 % (ref 0–6.9)
ERYTHROCYTE [DISTWIDTH] IN BLOOD BY AUTOMATED COUNT: 46.7 FL (ref 35.9–50)
GLUCOSE BLD-MCNC: 109 MG/DL (ref 65–99)
GLUCOSE SERPL-MCNC: 86 MG/DL (ref 65–99)
HCT VFR BLD AUTO: 34.2 % (ref 42–52)
HGB BLD-MCNC: 11.9 G/DL (ref 14–18)
IMM GRANULOCYTES # BLD AUTO: 0.07 K/UL (ref 0–0.11)
IMM GRANULOCYTES NFR BLD AUTO: 1.4 % (ref 0–0.9)
LYMPHOCYTES # BLD AUTO: 0.76 K/UL (ref 1–4.8)
LYMPHOCYTES NFR BLD: 15.4 % (ref 22–41)
MCH RBC QN AUTO: 30.8 PG (ref 27–33)
MCHC RBC AUTO-ENTMCNC: 34.8 G/DL (ref 33.7–35.3)
MCV RBC AUTO: 88.6 FL (ref 81.4–97.8)
MONOCYTES # BLD AUTO: 0.77 K/UL (ref 0–0.85)
MONOCYTES NFR BLD AUTO: 15.6 % (ref 0–13.4)
NEUTROPHILS # BLD AUTO: 3.22 K/UL (ref 1.82–7.42)
NEUTROPHILS NFR BLD: 65.2 % (ref 44–72)
NRBC # BLD AUTO: 0 K/UL
NRBC BLD-RTO: 0 /100 WBC
PLATELET # BLD AUTO: 239 K/UL (ref 164–446)
PMV BLD AUTO: 9.6 FL (ref 9–12.9)
POTASSIUM SERPL-SCNC: 3.7 MMOL/L (ref 3.6–5.5)
RBC # BLD AUTO: 3.86 M/UL (ref 4.7–6.1)
SODIUM SERPL-SCNC: 131 MMOL/L (ref 135–145)
WBC # BLD AUTO: 4.9 K/UL (ref 4.8–10.8)

## 2018-02-23 PROCEDURE — 80048 BASIC METABOLIC PNL TOTAL CA: CPT

## 2018-02-23 PROCEDURE — 85025 COMPLETE CBC W/AUTO DIFF WBC: CPT

## 2018-02-23 PROCEDURE — 97535 SELF CARE MNGMENT TRAINING: CPT

## 2018-02-23 PROCEDURE — 770006 HCHG ROOM/CARE - MED/SURG/GYN SEMI*

## 2018-02-23 PROCEDURE — 700102 HCHG RX REV CODE 250 W/ 637 OVERRIDE(OP): Performed by: HOSPITALIST

## 2018-02-23 PROCEDURE — 92526 ORAL FUNCTION THERAPY: CPT

## 2018-02-23 PROCEDURE — 99232 SBSQ HOSP IP/OBS MODERATE 35: CPT | Performed by: FAMILY MEDICINE

## 2018-02-23 PROCEDURE — A9270 NON-COVERED ITEM OR SERVICE: HCPCS | Performed by: HOSPITALIST

## 2018-02-23 PROCEDURE — 700102 HCHG RX REV CODE 250 W/ 637 OVERRIDE(OP): Performed by: INTERNAL MEDICINE

## 2018-02-23 PROCEDURE — 36415 COLL VENOUS BLD VENIPUNCTURE: CPT

## 2018-02-23 PROCEDURE — 82962 GLUCOSE BLOOD TEST: CPT

## 2018-02-23 PROCEDURE — G8988 SELF CARE GOAL STATUS: HCPCS | Mod: CJ

## 2018-02-23 PROCEDURE — 71045 X-RAY EXAM CHEST 1 VIEW: CPT

## 2018-02-23 PROCEDURE — G8987 SELF CARE CURRENT STATUS: HCPCS | Mod: CL

## 2018-02-23 PROCEDURE — A9270 NON-COVERED ITEM OR SERVICE: HCPCS | Performed by: INTERNAL MEDICINE

## 2018-02-23 RX ADMIN — OMEPRAZOLE 20 MG: 20 CAPSULE, DELAYED RELEASE ORAL at 09:50

## 2018-02-23 RX ADMIN — Medication 500 MCG: at 09:49

## 2018-02-23 RX ADMIN — Medication 500 MG: at 16:59

## 2018-02-23 RX ADMIN — ATORVASTATIN CALCIUM 10 MG: 10 TABLET ORAL at 21:00

## 2018-02-23 RX ADMIN — LEVETIRACETAM 1000 MG: 100 SOLUTION ORAL at 17:00

## 2018-02-23 RX ADMIN — LEVETIRACETAM 1000 MG: 100 SOLUTION ORAL at 13:25

## 2018-02-23 RX ADMIN — OXYCODONE HYDROCHLORIDE 2.5 MG: 5 TABLET ORAL at 09:57

## 2018-02-23 RX ADMIN — OXYCODONE HYDROCHLORIDE 2.5 MG: 5 TABLET ORAL at 13:25

## 2018-02-23 RX ADMIN — VITAMIN D, TAB 1000IU (100/BT) 5000 UNITS: 25 TAB at 09:49

## 2018-02-23 RX ADMIN — TAMSULOSIN HYDROCHLORIDE 0.4 MG: 0.4 CAPSULE ORAL at 09:49

## 2018-02-23 RX ADMIN — BACLOFEN 5 MG: 10 TABLET ORAL at 09:50

## 2018-02-23 RX ADMIN — OXYCODONE HYDROCHLORIDE 2.5 MG: 5 TABLET ORAL at 17:00

## 2018-02-23 RX ADMIN — STANDARDIZED SENNA CONCENTRATE AND DOCUSATE SODIUM 2 TABLET: 8.6; 5 TABLET, FILM COATED ORAL at 09:49

## 2018-02-23 RX ADMIN — BACLOFEN 5 MG: 10 TABLET ORAL at 21:00

## 2018-02-23 RX ADMIN — LEVETIRACETAM 1000 MG: 100 SOLUTION ORAL at 09:51

## 2018-02-23 RX ADMIN — Medication 500 MG: at 09:49

## 2018-02-23 RX ADMIN — LEVETIRACETAM 2000 MG: 100 SOLUTION ORAL at 09:52

## 2018-02-23 NOTE — PROGRESS NOTES
62yoM with TBI and right femoral neck nonunion.  Has acute left displaced femoral neck fx s/p hemiarthroplasty 2/20.    S: Doing okay, no complaints    O:    Vitals:    02/23/18 0400 02/23/18 0809 02/23/18 0952 02/23/18 1219   BP: 124/72 105/75  134/79   Pulse: 81 90 94 91   Resp: 16 15 16 13   Temp: 36.4 °C (97.5 °F) 36.9 °C (98.4 °F)  36.4 °C (97.5 °F)   SpO2: 92% 93% 92% 94%   Weight:       Height:         Exam:  General-NAD, alert and following commands  LLE-hip dressing c/d/i, flexing/extending toes and dorsi/plantarflexing foot    Hct: 34.2    A: 62yoM with TBI and right femoral neck nonunion.  Has acute left displaced femoral neck fx s/p hemiarthroplasty 2/20.    Recs;  --WBAT LLE  --posterior hip precautions  --PT/OT   --SNF placement  --fu 2 weeks postop

## 2018-02-23 NOTE — PROGRESS NOTES
"   Orthopaedic Progress Note    Interval changes:  Patient doing well  Cleared by ortho for DC to SNF pending medicine clearance    ROS - Patient denies any new issues.  Pain well controlled.    Blood pressure 127/85, pulse (!) 102, temperature 37.3 °C (99.2 °F), resp. rate 15, height 1.88 m (6' 2\"), weight 86.1 kg (189 lb 13.1 oz), SpO2 92 %.      Patient seen and examined  No acute distress  Breathing non labored  RRR  Left hip Surgical dressing is clean, dry, and intact. Patient clearly fires tibialis anterior, EHL, and gastrocnemius/soleus. Sensation is intact to light touch throughout superficial peroneal, deep peroneal, tibial, saphenous, and sural nerve distributions. Strong and palpable 2+ dorsalis pedis and posterior tibial pulses with capillary refill less than 2 seconds. No lower leg tenderness or discomfort.       Recent Labs      02/20/18   0431  02/21/18   0452  02/22/18   0408   WBC  6.0  9.2  6.7   RBC  4.24*  3.70*  3.82*   HEMOGLOBIN  13.2*  11.4*  11.4*   HEMATOCRIT  38.5*  33.1*  34.2*   MCV  90.8  89.5  89.5   MCH  31.1  30.8  29.8   MCHC  34.3  34.4  33.3*   RDW  49.7  46.3  48.0   PLATELETCT  167  168  243   MPV  10.0  10.1  10.3       Active Hospital Problems    Diagnosis   • Acute encephalopathy [G93.40]     Priority: High   • Hypoxia [R09.02]     Priority: High   • Femoral neck fracture (CMS-HCC) [S72.009A]     Priority: High   • Vitamin D deficiency [E55.9]     Priority: Medium   • Femoral neck stress fracture, with nonunion, subsequent encounter [M84.359K]     Priority: Medium   • Swelling of right upper extremity [M79.89]     Priority: Medium   • Dysphagia [R13.10]     Priority: Medium   • History of DVT (deep vein thrombosis) [Z86.718]     Priority: Medium   • H/O traumatic brain injury [Z87.820]     Priority: Medium   • Hyponatremia [E87.1]     Priority: Medium   • BPH (benign prostatic hyperplasia) [N40.0]     Priority: Low       Assessment/Plan:  Cleared for DC to SNF by ortho " pending medicine clearance  POD#2 S/P Open treatment of left femoral neck fracture with hip hemiarthroplasty.  Wt bearing status - WBAT  Wound care/Drains - dressing left in place  Future Procedures - none planned  Sutures/Staples out- 10-14 days post operatively  PT/OT-initiated  Antibiotics: completed  DVT Prophylaxis- TEDS/SCDs/Foot pumps  Sam-none   Case Coordination for Discharge Planning - Disposition SNF

## 2018-02-23 NOTE — PROGRESS NOTES
Pt had speech eval, speech therapist recommends barium swallow as pt coughed after almost every bite. She also states pt is wheezing. Called RT to evaluate and administer a breathing tx if necessary. Pt keeps removing 02 tubing from nose, but is on 93% on room air currently.     Pt complained of pain a few times throughout the day, medicated with Oxy 2.5 mg. Pt still has cough. Pt mentioned to CNA that he thinks today he is going to die. Denies needs at this time, compliant with medications, tolerates 1 to 1 feeds well.

## 2018-02-23 NOTE — PROGRESS NOTES
Assumed care of pt. Pt resting in bed. Safety and fall precautions in place, call light and belongings within reach. Bed alarm on.

## 2018-02-23 NOTE — PROGRESS NOTES
CT called to say they'd like to do the CTA, but they can't do it if pt had dialysis already. Pt has been in dialysis since 0800 this AM. CT states either postpone CTA until Monday before dialysis, or pt can have CTA done tomorrow or Sunday, as long as he has dialysis after to flush out the contrast. WIll notify MD. CT ext. [phone]

## 2018-02-23 NOTE — THERAPY
"Occupational Therapy Treatment completed with focus on ADL transfers and upper extremity function.  Functional Status:  Pt was agreeable to tx. Pt reported moderate pain in L hip in supine. Attempted to get patient to EOB, but pt reported being too tired. Pt performed bed mobility, was Min A to roll to R side and can ankle pump L ankle. LUE arm exercises performed to increase mobility and strength for transfers in future. Pt right side LE+UE has increased tone from prior TBI. A pillow was inserted under L pelvis to prevent bed sores.   Plan of Care: Will benefit from Occupational Therapy 3 times per week  Discharge Recommendations:  Equipment Will Continue to Assess for Equipment Needs. Post-acute therapy Discharge to a transitional care facility for continued skilled therapy services.    See \"Rehab Therapy-Acute\" Patient Summary Report for complete documentation.   "

## 2018-02-23 NOTE — THERAPY
"Speech Language Therapy dysphagia treatment completed.   Functional Status: Patient currently on NTFL diet, but found to have large pitcher of thin liquids at bedside, removed by this SLP. Patient seen with NTFL breakfast tray. Patient consumed approximately 50% of breakfast tray with this SLP present. Patient had frequent coughing after approximately 50% of PO trials and was noted to have more wheezing than last tx session. Patient was instructed to utilize swallow precautions for double effortful swallows and cough/clear if vocal quality is wet/gurgly and self-monitored approximately 50% of the time regarding vocal quality. Patient required oral suction x1 and secretions were removed that were suspicious for PO trials previously given. Patient completed laryngeal elevation exercises x5 with \"fair\" accuracy. At this time, patient appears to have decline in swallow function and does not appear safe to continue NTFL diet d/t intermittent s/sx of aspiration and worsening respiratory status. Discussed results and recs with MD Nance and MD to speak with patient's POA today regarding continuing NPO with placement of Cortrak vs. diagnostic swallow evaluation to further evaluate oropharyngeal dysphagia and r/o aspiration vs. eating despite risks. Will defer recs to MD at this time, as MD reported patient's POA may not want aggressive dysphagia tx. RN aware. SLP is following based on POC.     Recommendations: At this time, patient appears to have decline in swallow function and does not appear safe to continue NTFL diet d/t intermittent s/sx of aspiration and worsening respiratory status. Discussed results and recs with MD Nance and MD to speak with patient's POA today regarding continuing NPO with placement of Cortrak vs. diagnostic swallow evaluation to further evaluate oropharyngeal dysphagia and r/o aspiration vs. eating despite risks. Will defer recs to MD at this time, as MD reported patient's POA may not want " "aggressive dysphagia tx.     Plan of Care: Will benefit from Speech Therapy 3 times per week  Post-Acute Therapy: Discharge to a transitional care facility for continued skilled therapy services.  See \"Rehab Therapy-Acute\" Patient Summary Report for complete documentation.     "

## 2018-02-24 VITALS
RESPIRATION RATE: 18 BRPM | HEART RATE: 89 BPM | WEIGHT: 189.82 LBS | HEIGHT: 74 IN | DIASTOLIC BLOOD PRESSURE: 82 MMHG | BODY MASS INDEX: 24.36 KG/M2 | SYSTOLIC BLOOD PRESSURE: 120 MMHG | OXYGEN SATURATION: 92 % | TEMPERATURE: 97.7 F

## 2018-02-24 LAB
ANION GAP SERPL CALC-SCNC: 10 MMOL/L (ref 0–11.9)
BASOPHILS # BLD AUTO: 0.7 % (ref 0–1.8)
BASOPHILS # BLD: 0.04 K/UL (ref 0–0.12)
BUN SERPL-MCNC: 17 MG/DL (ref 8–22)
CALCIUM SERPL-MCNC: 8.8 MG/DL (ref 8.5–10.5)
CHLORIDE SERPL-SCNC: 95 MMOL/L (ref 96–112)
CO2 SERPL-SCNC: 24 MMOL/L (ref 20–33)
CREAT SERPL-MCNC: 0.56 MG/DL (ref 0.5–1.4)
EOSINOPHIL # BLD AUTO: 0.12 K/UL (ref 0–0.51)
EOSINOPHIL NFR BLD: 2.1 % (ref 0–6.9)
ERYTHROCYTE [DISTWIDTH] IN BLOOD BY AUTOMATED COUNT: 47.1 FL (ref 35.9–50)
GLUCOSE SERPL-MCNC: 90 MG/DL (ref 65–99)
HCT VFR BLD AUTO: 34.7 % (ref 42–52)
HGB BLD-MCNC: 11.6 G/DL (ref 14–18)
IMM GRANULOCYTES # BLD AUTO: 0.07 K/UL (ref 0–0.11)
IMM GRANULOCYTES NFR BLD AUTO: 1.2 % (ref 0–0.9)
LYMPHOCYTES # BLD AUTO: 1.02 K/UL (ref 1–4.8)
LYMPHOCYTES NFR BLD: 17.7 % (ref 22–41)
MCH RBC QN AUTO: 29.7 PG (ref 27–33)
MCHC RBC AUTO-ENTMCNC: 33.4 G/DL (ref 33.7–35.3)
MCV RBC AUTO: 88.7 FL (ref 81.4–97.8)
MONOCYTES # BLD AUTO: 0.83 K/UL (ref 0–0.85)
MONOCYTES NFR BLD AUTO: 14.4 % (ref 0–13.4)
NEUTROPHILS # BLD AUTO: 3.68 K/UL (ref 1.82–7.42)
NEUTROPHILS NFR BLD: 63.9 % (ref 44–72)
NRBC # BLD AUTO: 0 K/UL
NRBC BLD-RTO: 0 /100 WBC
PLATELET # BLD AUTO: 271 K/UL (ref 164–446)
PMV BLD AUTO: 9.8 FL (ref 9–12.9)
POTASSIUM SERPL-SCNC: 3.6 MMOL/L (ref 3.6–5.5)
RBC # BLD AUTO: 3.91 M/UL (ref 4.7–6.1)
SODIUM SERPL-SCNC: 129 MMOL/L (ref 135–145)
WBC # BLD AUTO: 5.8 K/UL (ref 4.8–10.8)

## 2018-02-24 PROCEDURE — 85025 COMPLETE CBC W/AUTO DIFF WBC: CPT

## 2018-02-24 PROCEDURE — A9270 NON-COVERED ITEM OR SERVICE: HCPCS | Performed by: INTERNAL MEDICINE

## 2018-02-24 PROCEDURE — 99239 HOSP IP/OBS DSCHRG MGMT >30: CPT | Performed by: FAMILY MEDICINE

## 2018-02-24 PROCEDURE — 700102 HCHG RX REV CODE 250 W/ 637 OVERRIDE(OP): Performed by: HOSPITALIST

## 2018-02-24 PROCEDURE — 700102 HCHG RX REV CODE 250 W/ 637 OVERRIDE(OP): Performed by: INTERNAL MEDICINE

## 2018-02-24 PROCEDURE — 36415 COLL VENOUS BLD VENIPUNCTURE: CPT

## 2018-02-24 PROCEDURE — 80048 BASIC METABOLIC PNL TOTAL CA: CPT

## 2018-02-24 PROCEDURE — A9270 NON-COVERED ITEM OR SERVICE: HCPCS | Performed by: HOSPITALIST

## 2018-02-24 RX ORDER — LEVETIRACETAM 100 MG/ML
2000 SOLUTION ORAL EVERY MORNING
Qty: 240 ML
Start: 2018-02-25

## 2018-02-24 RX ORDER — LEVETIRACETAM 100 MG/ML
1000 SOLUTION ORAL 2 TIMES DAILY
Qty: 240 ML
Start: 2018-02-24

## 2018-02-24 RX ORDER — OXYCODONE HYDROCHLORIDE 5 MG/1
2.5 TABLET ORAL
Qty: 12 TAB | Refills: 0 | Status: SHIPPED | OUTPATIENT
Start: 2018-02-24 | End: 2018-02-27

## 2018-02-24 RX ORDER — ACETAMINOPHEN 325 MG/1
650 TABLET ORAL EVERY 6 HOURS PRN
Qty: 30 TAB | Refills: 0
Start: 2018-02-24

## 2018-02-24 RX ADMIN — LEVETIRACETAM 2000 MG: 100 SOLUTION ORAL at 08:26

## 2018-02-24 RX ADMIN — OMEPRAZOLE 20 MG: 20 CAPSULE, DELAYED RELEASE ORAL at 08:21

## 2018-02-24 RX ADMIN — VITAMIN D, TAB 1000IU (100/BT) 5000 UNITS: 25 TAB at 08:21

## 2018-02-24 RX ADMIN — Medication 500 MG: at 08:21

## 2018-02-24 RX ADMIN — TAMSULOSIN HYDROCHLORIDE 0.4 MG: 0.4 CAPSULE ORAL at 08:21

## 2018-02-24 RX ADMIN — LEVETIRACETAM 1000 MG: 100 SOLUTION ORAL at 13:02

## 2018-02-24 RX ADMIN — STANDARDIZED SENNA CONCENTRATE AND DOCUSATE SODIUM 2 TABLET: 8.6; 5 TABLET, FILM COATED ORAL at 08:23

## 2018-02-24 RX ADMIN — BACLOFEN 5 MG: 10 TABLET ORAL at 08:21

## 2018-02-24 RX ADMIN — Medication 500 MCG: at 08:22

## 2018-02-24 RX ADMIN — OXYCODONE HYDROCHLORIDE 2.5 MG: 5 TABLET ORAL at 13:01

## 2018-02-24 ASSESSMENT — LIFESTYLE VARIABLES: DO YOU DRINK ALCOHOL: NO

## 2018-02-24 ASSESSMENT — PAIN SCALES - GENERAL: PAINLEVEL_OUTOF10: ASSUMED PAIN PRESENT

## 2018-02-24 NOTE — DISCHARGE SUMMARY
Hospital Medicine Discharge Note     Admit Date:  2/16/2018       Discharge Date:   2/24/2018    Attending Physician:  Nabeel Nance     Diagnoses (includes active and resolved):   Principal Problem:    Femoral neck fracture (CMS-HCC) POA: Yes  Active Problems:    Hypoxia POA: Yes    Acute encephalopathy POA: Yes    History of DVT (deep vein thrombosis) POA: Yes    H/O traumatic brain injury POA: Yes    Hyponatremia POA: Yes    Dysphagia POA: Yes    Vitamin D deficiency POA: Yes    Femoral neck stress fracture, with nonunion, subsequent encounter POA: Yes    Swelling of right upper extremity POA: No    BPH (benign prostatic hyperplasia) POA: Yes  Resolved Problems:    * No resolved hospital problems. *      Hospital Summary (Brief Narrative):       62-year-old white male who was brought in secondary to left femoral neck fracture. He had left hip hemiarthroplasty done. He had a previous right femoral neck fracture which apparently was not repaired and was told to have nonunion. He had some hypoxia and encephalopathy when he came in which has improved. Had history of DVT is Xarelto was stopped prior to surgery. He has some swelling of the right upper extremity but venous duplex was noted to be negative. He will need continued rehab with inpatient physical and occupational therapy. Was also not noted to have dysphagia and is on nectar thick liquid diet he will need continued work with speech         Consultants:      Orthopedic surgery - [facility]    Procedures:        Open treatment of left femoral neck fracture with hip hemiarthroplasty.    Discharge Medications:        Medication Reconciliation Completed    Oxycodone 2.5 mg every 3 hrs as needed for pain.     Medication List      START taking these medications      Instructions   acetaminophen 325 MG Tabs  Commonly known as:  TYLENOL   Take 2 Tabs by mouth every 6 hours as needed.  Dose:  650 mg     * levETIRAcetam 100 MG/ML Soln  Commonly known as:  KEPPRA   Take  10 mL by mouth 2 Times a Day.  Dose:  1000 mg     * levETIRAcetam 100 MG/ML Soln  Start taking on:  2/25/2018  Commonly known as:  KEPPRA  Replaces:  levetiracetam 1000 MG tablet   Take 20 mL by mouth every morning.  Dose:  2000 mg        * This list has 2 medication(s) that are the same as other medications prescribed for you. Read the directions carefully, and ask your doctor or other care provider to review them with you.            CONTINUE taking these medications      Instructions   atorvastatin 10 MG Tabs  Commonly known as:  LIPITOR   Take 10 mg by mouth every evening.  Dose:  10 mg     baclofen 10 MG Tabs  Commonly known as:  LIORESAL   Take 5 mg by mouth 2 times a day.  Dose:  5 mg     cyanocobalamin 500 MCG Tabs  Commonly known as:  VITAMIN B-12   Take 500 mcg by mouth every day.  Dose:  500 mcg     omeprazole 20 MG delayed-release capsule  Commonly known as:  PRILOSEC   Take 20 mg by mouth every day.  Dose:  20 mg     rivaroxaban 20 MG Tabs tablet  Commonly known as:  XARELTO   Take 20 mg by mouth with dinner.  Dose:  20 mg     tamsulosin 0.4 MG capsule  Commonly known as:  FLOMAX   Take 0.4 mg by mouth ONE-HALF HOUR AFTER BREAKFAST.  Dose:  0.4 mg     Vitamin D3 2000 UNIT Caps   Take 1 Cap by mouth every day.  Dose:  1 Cap        STOP taking these medications    levetiracetam 1000 MG tablet  Commonly known as:  KEPPRA  Replaced by:  levETIRAcetam 100 MG/ML Soln              Disposition:  SNF    Diet:   Nectar thick liquid diet    Activity:   As tolerated    Code status:  Full    Primary Care Provider:    Pcp Not In Computer    Follow up appointment details :        No follow-up provider specified.  No future appointments.    Pending Studies:        None    Time spent on discharge day patient visit: 35 minutes    #################################################      Most Recent Labs:    Lab Results   Component Value Date/Time    WBC 5.8 02/24/2018 02:11 AM    RBC 3.91 (L) 02/24/2018 02:11 AM     HEMOGLOBIN 11.6 (L) 02/24/2018 02:11 AM    HEMATOCRIT 34.7 (L) 02/24/2018 02:11 AM    MCV 88.7 02/24/2018 02:11 AM    MCH 29.7 02/24/2018 02:11 AM    MCHC 33.4 (L) 02/24/2018 02:11 AM    MPV 9.8 02/24/2018 02:11 AM    NEUTSPOLYS 63.90 02/24/2018 02:11 AM    LYMPHOCYTES 17.70 (L) 02/24/2018 02:11 AM    MONOCYTES 14.40 (H) 02/24/2018 02:11 AM    EOSINOPHILS 2.10 02/24/2018 02:11 AM    BASOPHILS 0.70 02/24/2018 02:11 AM      Lab Results   Component Value Date/Time    SODIUM 129 (L) 02/24/2018 02:11 AM    POTASSIUM 3.6 02/24/2018 02:11 AM    CHLORIDE 95 (L) 02/24/2018 02:11 AM    CO2 24 02/24/2018 02:11 AM    GLUCOSE 90 02/24/2018 02:11 AM    BUN 17 02/24/2018 02:11 AM    CREATININE 0.56 02/24/2018 02:11 AM      Lab Results   Component Value Date/Time    ALTSGPT 9 02/19/2018 02:50 AM    ASTSGOT 11 (L) 02/19/2018 02:50 AM    ALKPHOSPHAT 77 02/19/2018 02:50 AM    TBILIRUBIN 0.7 02/19/2018 02:50 AM    ALBUMIN 3.1 (L) 02/19/2018 02:50 AM    GLOBULIN 2.9 02/19/2018 02:50 AM    INR 1.19 (H) 02/19/2018 02:50 AM     Lab Results   Component Value Date/Time    PROTHROMBTM 14.8 (H) 02/19/2018 02:50 AM    INR 1.19 (H) 02/19/2018 02:50 AM        Imaging/ Testing:      DX-CHEST-PORTABLE (1 VIEW)   Final Result      Partial interval clearing bilateral edema and/or pneumonitis.      UE VENOUS DUPLEX ([facility] Only)   Final Result      DX-CHEST-PORTABLE (1 VIEW)   Final Result         New patchy airspace opacities in the bilateral lower lobes, left more than right, concerning for developing pneumonia or moderate pulmonary edema.      DX-PELVIS-1 OR 2 VIEWS   Final Result      Status post left hip arthroplasty      ECHOCARDIOGRAM COMP W/O CONT   Final Result      CT-HEAD W/O   Final Result      1.  No evidence of acute territorial infarct, intracranial hemorrhage or mass lesion.   2.  Bilateral frontal lobe, right greater than left encephalomalacia., Likely posttraumatic in etiology.   3.  Mild diffuse cerebral and  cerebellar substance loss.   4.  Advanced microangiopathic ischemic change versus demyelination or gliosis.      DX-FEMUR-2+ LEFT   Final Result      No radiographic evidence of acute traumatic injury in the distal LEFT femur.      DX-CHEST-LIMITED (1 VIEW)   Final Result      Minimal interstitial prominence at the left lung base may represent atelectasis or pneumonitis.      No pneumothorax identified.               DX-CHEST-PORTABLE (1 VIEW)   Final Result      Mild patchy groundglass opacity at the left lung base likely represents atelectasis. Pneumonitis not excluded.      Mild cardiomegaly.      Linear lucency at the right lung apex likely represents a skinfold. Follow-up plain film in one hour is recommended to exclude a small pneumothorax which is thought to be unlikely.      Findings discussed with Dr. Cates at 1:40 PM.               DX-HIP-UNILATERAL-WITH PELVIS-1 VIEW LEFT   Final Result      Bilateral femoral neck fractures.      Demineralization.      Deformity of the superior pubic ramus on the right may be chronic.      Degenerative changes in the visualized lower lumbar spine.      Moderate amount of colonic stool.          Instructions:      The patient was instructed to return to the ER in the event of worsening symptoms. I have counseled the patient on the importance of compliance and the patient has agreed to proceed with all medical recommendations and follow up plan indicated above.   The patient understands that all medications come with benefits and risks. Risks may include permanent injury or death and these risks can be minimized with close reassessment and monitoring.

## 2018-02-24 NOTE — CARE PLAN
Problem: Communication  Goal: The ability to communicate needs accurately and effectively will improve  Outcome: PROGRESSING SLOWER THAN EXPECTED  Pt uses call light but does not seem to know what it is for. Pt will make needs known when asked however.     Problem: Skin Integrity  Goal: Risk for impaired skin integrity will decrease  Outcome: PROGRESSING AS EXPECTED  Q2 turns enforced, and padding under bony areas.

## 2018-02-24 NOTE — PROGRESS NOTES
Assumed care of pt. Pt denies pain at this time. Condom catheter still in place. Safety and fall precautions in place, call light and belongings within reach. Declines needs at this time.

## 2018-02-24 NOTE — DISCHARGE INSTRUCTIONS
Discharge Instructions    Discharged to [facility] home by medical transportation with escort. Discharged via wheelchair, hospital escort: Yes.  Special equipment needed: Not Applicable    Be sure to schedule a follow-up appointment with your primary care doctor or any specialists as instructed.     Discharge Plan:   Diet Plan: Discussed  Activity Level: Discussed  Confirmed Follow up Appointment: Patient to Call and Schedule Appointment  Confirmed Symptoms Management: Discussed  Medication Reconciliation Updated: Yes  Influenza Vaccine Indication: Not indicated: Previously immunized this influenza season and > 8 years of age (Per patient)    I understand that a diet low in cholesterol, fat, and sodium is recommended for good health. Unless I have been given specific instructions below for another diet, I accept this instruction as my diet prescription.   Other diet: Nectar full thick    Special Instructions: Discharge instructions for the Orthopedic Patient    Follow up with Primary Care Physician within 2 weeks of discharge to home, regarding:  Review of medications and diagnostic testing.  Surveillance for medical complications.  Workup and treatment of osteoporosis, if appropriate.     -Is this a Joint Replacement patient? Yes   Total Joint Hip Replacement Discharge Instructions    Pain  - The goal is to slowly wean off the prescription pain medicine.  - Ice can be used for pain control.  20 minutes at a time is recommended, and never directly against your skin or incision.  - Most patients are off the pain pills by 3 weeks; others may require a low level of pain medications for many months. If your pain continues to be severe, follow up with your physician.  Infection  Deep hip joint infections that require removal of the prostheses occur in less than 0.1% of patients. Lesser infections in the skin (cellulites) are more common and much more easily treated.  - Keep the incision as clean and dry as possible.  - Always  wash your hands before touching your incision.  - Skin infections tend to develop around 7-10 days after surgery, most can be treated with oral antibiotics.  - Dental Care should be delayed for 3 months after surgery, your surgeon recommends taking a dose of antibiotics 1 hour prior to any dental procedure.  After 2 years, most surgeons recommend antibiotics only before an extensive procedure.  Ask your surgeon what he recommends.  - Signs and symptoms of infection can include:  low grade fever, redness, pain, swelling and drainage from your incision.  Notify your surgeon immediately if you develop any of these symptoms.  Post op Disturbances  - Bowel habits - constipation is extremely common and is caused by a combination of anesthesia, lack of mobility and pain medicine.  Use stool softeners or laxatives if necessary. It is important not to ignore this problem, as bowel obstructions can be a serious complication after joint replacement surgery.  - Mood/Energy Level - Many patients experience a lack of energy and endurance for up to 2-3 months after surgery.  Some may also feel down and can even become depressed.  This is likely due to the postoperative anemia, change in activity level, lack of sleep, pain medicine and just the emotional reaction to the surgery itself that is a big disruption in a person’s life.  This usually passes.  If symptoms persist, follow up with your primary physician.  - Returning to work - Your surgeon will give you more specific instructions.  Generally, if you work a sedentary job requiring little standing or walking, most patients may return within 2-6 weeks.  Manual labor jobs involving walking, lifting and standing may take 3-4 months.  Your surgeon’s office can provide a release to part-time or light duty work early on in your recovery and progress you to full duty as able.  - Driving - You can begin driving an automatic shift car in 4 to 8 weeks, provided you are no longer taking  narcotic pain medication. If you have a stick-shift car and your right hip was replaced, do not begin driving until your doctor says you can.   - Avoiding falls -  throw rugs and tack down loose carpeting.  Be aware of floor hazards such as pets, small objects or uneven surfaces.   -  Airport Metal Detectors - The sensitivity of metal detectors varies and it is likely that your prosthesis will cause an alarm. Inform the  that you have an artificial joint.  Diet  - Resume your normal diet as tolerated.  - It is important to achieve a healthy nutritional status by eating a well balanced diet on a regular basis.  - Your physician may recommend that you take iron and vitamin supplements.   - Continue to drink plenty of fluids.  Shower/Bathing  - You may shower as soon as you get home from the hospital unless otherwise instructed.  - Keep your incision out of water.  To keep the incision dry when showering, cover it with a plastic bag or plastic wrap.  - Pat incision dry if it gets wet.  Don’t rub.  - Do not submerge in a bath until staples are out and the incision is completely healed. (Approximately 6-8 weeks after surgery).  Dressing Change:  Procedure (if recommended by your physician)  - Wash hands.  - Open all dressing change materials.  - Remove old dressing and discard.  - Inspect incision for redness, increase in clear drainage, yellow/green drainage, odor and surrounding skin hot to touch.  -  ABD (large gauze) pad by one corner and lay over the incision.  Be careful not to touch the inside of the dressing that will lay over the incision.  - Secure in place as instructed (Ace wrap or tape).    Swelling/Bruising  - Swelling is normal after hip replacement and can involve the thigh, knee, calf and foot.  - Swelling can last from 3-6 months.  - Elevate your leg higher than your heart while reclining.  The first week you are home you should elevate your leg an equal amount of time, as  you are active.    - Anti-inflammatory pills can be taken once you have stopped the blood thinners.  - The swelling is usually worse after you go home since you are upright for longer periods of time.  - Bruising is common and can involve the entire leg including the thigh, calf and even foot.  Bruising often does not appear until after you arrive home and it can be quite dramatic- purple, black, green.  The bruising you can see is not usually concerning and will subside without any treatment.      Blood Clot Prevention  Blood clots in the legs and the less common, but frightening, clots that travel to the lungs are a real focus of our preventative. Most patients are at standard risk for them, but those patients who are at higher risk include people who have had previous clots, a family history of clotting, smoking, diabetes, obesity, advanced age, use of estrogen and a sedentary lifestyle.    - Signs of blood clots in legs - Swelling in thigh, calf or ankle that does not go down with elevation.  Pain, heat and tenderness in calf, back of calf or groin area.  NOTE: blood clots can occur in either leg.  - You have been receiving anticoagulant therapy (blood thinners) in the hospital and you may be instructed to continue at home depending on your risk factors.  - Your risk for developing a clot continues for up to 2-3 months after surgery.  You should avoid prolonged sitting and dehydration during that time (long air trips and car trips).  If you do take a trip during this time, please get up and move around every 1- 1.5 hours.  - If you are prescribed blood thinning medication for home, follow instructions as directed. (Handouts provided if applicable).      Activity    Once you get home, you should stay active. The key is not to overdo it! While you can expect some good days and some bad days, you should notice a gradual improvement over time you should notice a gradual improvement and a gradual increase in your  endurance over the next 6 to 12 months.    - Weight Bearing - If you have undergone cemented or hybrid hip replacement, you can put some weight on the leg immediately using a cane or walker, and you should continue to use some support for 4 to 6 weeks to help the muscles recover.   - Sleeping Positions - Sleep on your back with your legs slightly apart or on your side with a regular pillow between your knees. Be sure to use the pillow for at least 6 weeks, or until your doctor says you can do without it. Sleeping on your stomach should be all right  - Sitting - For at least the first 3 months, sit only in chairs that have arms. Do not sit on low chairs, low stools, or reclining chairs. Do not cross your legs at the knees. The physical therapist will show you how to sit and stand from a chair, keeping your affected leg out in front of you. Get up and move around on a regular basis--at least once every hour.  - Walking - Walk as much as you like once your doctor gives you the go-ahead, but remember that walking is no substitute for your prescribed exercises. Walking with a pair of trekking poles is helpful and adds as much as 40% to the exercise you get when you walk  - Therapy may be needed in some cases, to strengthen your muscles and improve your gait (walking pattern).  This decision will be made at your post-operative appointment.  Follow your therapist recommended post-operative exercises (handout provided by Therapist).  - Swimming is also recommended; you can begin as soon as the sutures have been removed and the wound is healed, approximately 6 to 8 weeks after surgery. Using a pair of training fins may make swimming a more enjoyable and effective exercise.  - Other activities - Lower impact activities are preferred.  If you have specific questions, consult your Surgeon.    - Sexual activity - Your surgeon can tell you when it should be safe to resume sexual activity.      When to Call the Doctor   Call the  physician if:   - Fever over 100.5? F  - Increased pain, drainage, redness, odor or heat around the incision area  - Shaking chills  - Increased knee pain with activity and rest  - Increased pain in calf, tenderness or redness above or below the knee  - Increased swelling of calf, ankle, foot  - Sudden increased shortness of breath, sudden onset of chest pain, localized chest pain with coughing  - Incision opening  Or, if there are any questions or concerns about medications or care.       -Is this patient being discharged with medication to prevent blood clots?  No    · Is patient discharged on Warfarin / Coumadin?   No     Depression / Suicide Risk    As you are discharged from this [facility] facility, it is important to learn how to keep safe from harming yourself.    Recognize the warning signs:  · Abrupt changes in personality, positive or negative- including increase in energy   · Giving away possessions  · Change in eating patterns- significant weight changes-  positive or negative  · Change in sleeping patterns- unable to sleep or sleeping all the time   · Unwillingness or inability to communicate  · Depression  · Unusual sadness, discouragement and loneliness  · Talk of wanting to die  · Neglect of personal appearance   · Rebelliousness- reckless behavior  · Withdrawal from people/activities they love  · Confusion- inability to concentrate     If you or a loved one observes any of these behaviors or has concerns about self-harm, here's what you can do:  · Talk about it- your feelings and reasons for harming yourself  · Remove any means that you might use to hurt yourself (examples: pills, rope, extension cords, firearm)  · Get professional help from the community (Mental Health, Substance Abuse, psychological counseling)  · Do not be alone:Call your Safe Contact- someone whom you trust who will be there for you.  · Call your local CRISIS HOTLINE 070-9381 or 014-718-1173  · Call your local Children's  Mobile Crisis Response Team [place] (083) 353-4421 or www.OKDJ.fm.manetch  · Call the toll free National Suicide Prevention Hotlines   · National Suicide Prevention Lifeline 423-490-TSQG (3439)  · National Hope Line Network 800-SUICIDE (675-9461)

## 2018-02-24 NOTE — PROGRESS NOTES
[facility] Hospitalist Progress Note    Date of Service: 2018    Chief Complaint  62 y.o. 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     Interval Problem Update  Femoral fx - pain controlled  Hypoxia - off O2  Enceph - unknown baseline  R arm swelling - venous duplex negative  Dysphagia - SLP with concerns for possible intermittent aspiration, discussed with pt he is refusing TF, unable to contact family as they have no phone    Consultants/Specialty  Ortho - [place]    Disposition  SNF        Review of Systems   Unable to perform ROS: Medical condition      Physical Exam  Laboratory/Imaging   Hemodynamics  Temp (24hrs), Av.6 °C (97.9 °F), Min:36.4 °C (97.5 °F), Max:36.9 °C (98.4 °F)   Temperature: 36.9 °C (98.4 °F)  Pulse  Av.9  Min: 57  Max: 104    Blood Pressure: 133/83      Respiratory      Respiration: 14, Pulse Oximetry: 92 %, O2 Daily Delivery Respiratory : Room Air with O2 Available     Work Of Breathing / Effort: Mild  RUL Breath Sounds: Rhonchi, RML Breath Sounds: Diminished, RLL Breath Sounds: Diminished, JAVIER Breath Sounds: Rhonchi, LLL Breath Sounds: Diminished    Fluids    Intake/Output Summary (Last 24 hours) at 18 1646  Last data filed at 18 0400   Gross per 24 hour   Intake                0 ml   Output                0 ml   Net                0 ml       Nutrition  Orders Placed This Encounter   Procedures   • DIET ORDER     Standing Status:   Standing     Number of Occurrences:   1     Order Specific Question:   Diet:     Answer:   Full Liquid [11]     Order Specific Question:   Consistency/Fluid modifications:     Answer:   Nectar Thick [2]     Order Specific Question:   Miscellaneous modifications:     Answer:   SLP - 1:1 Supervision by Nursing [21]     Physical Exam   Constitutional: He appears well-developed and well-nourished.   HENT:   Head: Normocephalic.   Eyes: Conjunctivae are  normal. Pupils are equal, round, and reactive to light.   Neck: No tracheal deviation present. No thyromegaly present.   Cardiovascular: Normal rate and regular rhythm.    Pulmonary/Chest: Effort normal and breath sounds normal.   Abdominal: Soft. Bowel sounds are normal. He exhibits no distension. There is no tenderness.   Musculoskeletal: He exhibits edema.   Right upper extremity swelling   Lymphadenopathy:     He has no cervical adenopathy.   Neurological: He is alert.   Oriented to self  Follows commands   Skin: Skin is warm and dry.   Nursing note and vitals reviewed.      Recent Labs      02/21/18 0452 02/22/18 0408  02/23/18   0406   WBC  9.2  6.7  4.9   RBC  3.70*  3.82*  3.86*   HEMOGLOBIN  11.4*  11.4*  11.9*   HEMATOCRIT  33.1*  34.2*  34.2*   MCV  89.5  89.5  88.6   MCH  30.8  29.8  30.8   MCHC  34.4  33.3*  34.8   RDW  46.3  48.0  46.7   PLATELETCT  168  243  239   MPV  10.1  10.3  9.6     Recent Labs      02/21/18 0452 02/22/18   0408  02/23/18   0406   SODIUM  131*  132*  131*   POTASSIUM  4.0  3.7  3.7   CHLORIDE  100  99  98   CO2  24  26  26   GLUCOSE  119*  99  86   BUN  18  15  16   CREATININE  0.56  0.60  0.50   CALCIUM  9.1  8.8  8.6         Recent Labs      02/21/18 0452 02/22/18   0408   BNPBTYPENAT  100  112*              Assessment/Plan     * Femoral neck fracture ([facility])- (present on admission)   Assessment & Plan    s/p Hip hemiarthoplasty        Acute encephalopathy- (present on admission)   Assessment & Plan    multifactorial        Hypoxia- (present on admission)   Assessment & Plan    Keep O2 sats above 90%  Encourage IS        Swelling of right upper extremity   Assessment & Plan    venous duplex negative        Femoral neck stress fracture, with nonunion, subsequent encounter- (present on admission)   Assessment & Plan    Right femoral neck, chronic, treated nonsurgically           Vitamin D deficiency- (present on admission)   Assessment & Plan    replacement         Dysphagia- (present on admission)   Assessment & Plan    NTL, strict aspiration precautions  SLP to follow        Hyponatremia- (present on admission)   Assessment & Plan    follow bmp        H/O traumatic brain injury- (present on admission)   Assessment & Plan    Keppra        History of DVT (deep vein thrombosis)- (present on admission)   Assessment & Plan    resume Xarelto?         BPH (benign prostatic hyperplasia)- (present on admission)   Assessment & Plan    Flomax          Quality-Core Measures   Reviewed items::  Radiology images reviewed, EKG reviewed, Labs reviewed and Medications reviewed  Sam catheter::  No Sam  DVT prophylaxis - mechanical:  SCDs  Ulcer Prophylaxis::  No

## 2018-02-24 NOTE — DISCHARGE PLANNING
Medical Social Work    Ongoing: Pt has been accepted to [facility] and is medically cleared today.  This  spoke with pt's brother, who per previous SW notes is making decisions for pt.    Pt's brother consented to pt being transferred to [facility].  Informed pt's brother of transport time and he stated he will meet pt over at [facility].    Informed Bedside RN that [facility] will be picking up pt at 1445 and taking pt to [facility].    COBRA and transfer packet placed on chart.    Plan: Pt to transfer to [facility] at 1445 via [facility]

## 2018-02-24 NOTE — DISCHARGE PLANNING
Transport arranged with [facility]. Patient will be leaving today @1445 via [facility] going to [place]. MICHOACANO Degroot notified.

## 2018-02-24 NOTE — PROGRESS NOTES
Spoke with  [phone], she was wondering if pt can transport in wheelchair, pt states he is able to. Unable to obtain info from CNA and I have not seen pt in chair, but per pt he is able to. Attempted to reach SW and she did not answer.

## 2018-02-24 NOTE — PROGRESS NOTES
Patient cleared for discharge to [facility]. All discharge instructions reviewed with patient prior to discharge. All questions and concerns addressed.  Rx given per physician orders. Surgical dressing with minimal drainage. All follow-up appointment information reviewed and confirmed.  Patient discharged via [facility] to [facility] on gurney with all personal belongings.  States no complaints at this time and is stable on room air prior to discharge. Report called to Sierra at [facility].

## 2018-02-24 NOTE — CARE PLAN
Problem: Communication  Goal: The ability to communicate needs accurately and effectively will improve  Pt education provided on pt's need to communicate pain level.

## 2018-02-24 NOTE — CARE PLAN
Problem: Communication  Goal: The ability to communicate needs accurately and effectively will improve  Outcome: PROGRESSING SLOWER THAN EXPECTED  Pt does not utilize call light appropriately but verbalizes needs accurately.     Problem: Knowledge Deficit  Goal: Knowledge of disease process/condition, treatment plan, diagnostic tests, and medications will improve  Outcome: PROGRESSING SLOWER THAN EXPECTED  Due to TBI, pt does not possess great knowledge of disease process, however is compliant with medications.

## 2018-02-24 NOTE — PROGRESS NOTES
Patient cleared for discharge to [facility]. All discharge instructions reviewed with patient prior to discharge. All questions and concerns addressed. Surgical dressing to left hip with minimal drainage.  All follow-up appointment information reviewed and confirmed. Written rx sent with paperwork. Patient discharged to [facility] via [facility], escorted by jose antonio with all personal belongings.  States no complaints at this time and is stable on room air prior to discharge. Report called to Sierra at [facility].

## 2018-02-26 NOTE — DISCHARGE PLANNING
Received call from Renae at [facility], they have requested speech notes.  Speech notes faxed to [facility] at [phone]

## 2018-05-20 ENCOUNTER — APPOINTMENT (OUTPATIENT)
Dept: RADIOLOGY | Facility: MEDICAL CENTER | Age: 63
End: 2018-05-20
Attending: EMERGENCY MEDICINE
Payer: MEDICARE

## 2018-05-20 ENCOUNTER — HOSPITAL ENCOUNTER (EMERGENCY)
Facility: MEDICAL CENTER | Age: 63
End: 2018-05-20
Attending: EMERGENCY MEDICINE
Payer: MEDICARE

## 2018-05-20 VITALS
WEIGHT: 176.37 LBS | OXYGEN SATURATION: 98 % | BODY MASS INDEX: 26.73 KG/M2 | HEART RATE: 89 BPM | DIASTOLIC BLOOD PRESSURE: 96 MMHG | TEMPERATURE: 96.8 F | RESPIRATION RATE: 16 BRPM | HEIGHT: 68 IN | SYSTOLIC BLOOD PRESSURE: 133 MMHG

## 2018-05-20 DIAGNOSIS — R56.9 SEIZURE (HCC): ICD-10-CM

## 2018-05-20 LAB
ALBUMIN SERPL BCP-MCNC: 4.2 G/DL (ref 3.2–4.9)
ALBUMIN/GLOB SERPL: 1.4 G/DL
ALP SERPL-CCNC: 120 U/L (ref 30–99)
ALT SERPL-CCNC: 12 U/L (ref 2–50)
ANION GAP SERPL CALC-SCNC: 11 MMOL/L (ref 0–11.9)
APPEARANCE UR: CLEAR
AST SERPL-CCNC: 14 U/L (ref 12–45)
BACTERIA #/AREA URNS HPF: NEGATIVE /HPF
BASOPHILS # BLD AUTO: 1 % (ref 0–1.8)
BASOPHILS # BLD: 0.06 K/UL (ref 0–0.12)
BILIRUB SERPL-MCNC: 0.4 MG/DL (ref 0.1–1.5)
BILIRUB UR QL STRIP.AUTO: NEGATIVE
BUN SERPL-MCNC: 12 MG/DL (ref 8–22)
CALCIUM SERPL-MCNC: 9.9 MG/DL (ref 8.5–10.5)
CHLORIDE SERPL-SCNC: 98 MMOL/L (ref 96–112)
CO2 SERPL-SCNC: 22 MMOL/L (ref 20–33)
COLOR UR: YELLOW
CREAT SERPL-MCNC: 0.8 MG/DL (ref 0.5–1.4)
EOSINOPHIL # BLD AUTO: 0.17 K/UL (ref 0–0.51)
EOSINOPHIL NFR BLD: 2.8 % (ref 0–6.9)
EPI CELLS #/AREA URNS HPF: NEGATIVE /HPF
ERYTHROCYTE [DISTWIDTH] IN BLOOD BY AUTOMATED COUNT: 44.6 FL (ref 35.9–50)
GLOBULIN SER CALC-MCNC: 2.9 G/DL (ref 1.9–3.5)
GLUCOSE SERPL-MCNC: 98 MG/DL (ref 65–99)
GLUCOSE UR STRIP.AUTO-MCNC: NEGATIVE MG/DL
HCT VFR BLD AUTO: 43.5 % (ref 42–52)
HGB BLD-MCNC: 14.5 G/DL (ref 14–18)
HYALINE CASTS #/AREA URNS LPF: ABNORMAL /LPF
IMM GRANULOCYTES # BLD AUTO: 0.05 K/UL (ref 0–0.11)
IMM GRANULOCYTES NFR BLD AUTO: 0.8 % (ref 0–0.9)
KETONES UR STRIP.AUTO-MCNC: NEGATIVE MG/DL
LACTATE BLD-SCNC: 1.5 MMOL/L (ref 0.5–2)
LACTATE BLD-SCNC: 4.2 MMOL/L (ref 0.5–2)
LEUKOCYTE ESTERASE UR QL STRIP.AUTO: NEGATIVE
LYMPHOCYTES # BLD AUTO: 2.16 K/UL (ref 1–4.8)
LYMPHOCYTES NFR BLD: 35.3 % (ref 22–41)
MCH RBC QN AUTO: 29.4 PG (ref 27–33)
MCHC RBC AUTO-ENTMCNC: 33.3 G/DL (ref 33.7–35.3)
MCV RBC AUTO: 88.2 FL (ref 81.4–97.8)
MICRO URNS: ABNORMAL
MONOCYTES # BLD AUTO: 0.7 K/UL (ref 0–0.85)
MONOCYTES NFR BLD AUTO: 11.4 % (ref 0–13.4)
NEUTROPHILS # BLD AUTO: 2.98 K/UL (ref 1.82–7.42)
NEUTROPHILS NFR BLD: 48.7 % (ref 44–72)
NITRITE UR QL STRIP.AUTO: NEGATIVE
NRBC # BLD AUTO: 0 K/UL
NRBC BLD-RTO: 0 /100 WBC
PH UR STRIP.AUTO: 6.5 [PH]
PLATELET # BLD AUTO: 321 K/UL (ref 164–446)
PMV BLD AUTO: 9.7 FL (ref 9–12.9)
POTASSIUM SERPL-SCNC: 3.8 MMOL/L (ref 3.6–5.5)
PROT SERPL-MCNC: 7.1 G/DL (ref 6–8.2)
PROT UR QL STRIP: NEGATIVE MG/DL
RBC # BLD AUTO: 4.93 M/UL (ref 4.7–6.1)
RBC # URNS HPF: ABNORMAL /HPF
RBC UR QL AUTO: ABNORMAL
SODIUM SERPL-SCNC: 131 MMOL/L (ref 135–145)
SP GR UR STRIP.AUTO: 1.01
UROBILINOGEN UR STRIP.AUTO-MCNC: 0.2 MG/DL
WBC # BLD AUTO: 6.1 K/UL (ref 4.8–10.8)
WBC #/AREA URNS HPF: ABNORMAL /HPF

## 2018-05-20 PROCEDURE — 85025 COMPLETE CBC W/AUTO DIFF WBC: CPT

## 2018-05-20 PROCEDURE — 36415 COLL VENOUS BLD VENIPUNCTURE: CPT

## 2018-05-20 PROCEDURE — 83605 ASSAY OF LACTIC ACID: CPT

## 2018-05-20 PROCEDURE — 87040 BLOOD CULTURE FOR BACTERIA: CPT

## 2018-05-20 PROCEDURE — 71045 X-RAY EXAM CHEST 1 VIEW: CPT

## 2018-05-20 PROCEDURE — 99285 EMERGENCY DEPT VISIT HI MDM: CPT

## 2018-05-20 PROCEDURE — 700102 HCHG RX REV CODE 250 W/ 637 OVERRIDE(OP): Performed by: EMERGENCY MEDICINE

## 2018-05-20 PROCEDURE — 81001 URINALYSIS AUTO W/SCOPE: CPT

## 2018-05-20 PROCEDURE — 700105 HCHG RX REV CODE 258: Performed by: EMERGENCY MEDICINE

## 2018-05-20 PROCEDURE — 80053 COMPREHEN METABOLIC PANEL: CPT

## 2018-05-20 PROCEDURE — 87086 URINE CULTURE/COLONY COUNT: CPT

## 2018-05-20 PROCEDURE — A9270 NON-COVERED ITEM OR SERVICE: HCPCS | Performed by: EMERGENCY MEDICINE

## 2018-05-20 RX ORDER — LEVETIRACETAM 100 MG/ML
1000 SOLUTION ORAL ONCE
Status: COMPLETED | OUTPATIENT
Start: 2018-05-20 | End: 2018-05-20

## 2018-05-20 RX ORDER — SODIUM CHLORIDE 9 MG/ML
30 INJECTION, SOLUTION INTRAVENOUS ONCE
Status: COMPLETED | OUTPATIENT
Start: 2018-05-20 | End: 2018-05-20

## 2018-05-20 RX ORDER — SODIUM CHLORIDE 9 MG/ML
500 INJECTION, SOLUTION INTRAVENOUS ONCE
Status: COMPLETED | OUTPATIENT
Start: 2018-05-20 | End: 2018-05-20

## 2018-05-20 RX ADMIN — SODIUM CHLORIDE 500 ML: 9 INJECTION, SOLUTION INTRAVENOUS at 02:19

## 2018-05-20 RX ADMIN — SODIUM CHLORIDE 2400 ML: 9 INJECTION, SOLUTION INTRAVENOUS at 02:51

## 2018-05-20 RX ADMIN — LEVETIRACETAM 1000 MG: 100 SOLUTION ORAL at 05:04

## 2018-05-20 NOTE — ED PROVIDER NOTES
CHIEF COMPLAINT  Chief Complaint   Patient presents with   • Seizure     From [facility] for speech rehab s/p stroke. Witnessed sz by staff approx 0030. Hx sz's.        HPI  Mauricio Santizo is a 62 y.o. male with a history of seizure disorder with prior history of TBI and encephalopathy who presents from a nursing facility following a seizure earlier today.  He is on levetiracetam for his seizures.  Also on Xarelto for DVT history.    According to EMS, staff at the facility state that after a 1 minute seizure today around around 12:30 in the morning patient did not have prompt return to baseline.  States that he is typically verbal.  He is verbal here in the emergency department however.  Speaking his name and he is oriented to place as well.  Not fully oriented to time.    Upon arrival, EMS did note that the patient was hypoxic.  Not hypoxic here though tachycardic.  Denying headaches.  Denies chest pain.  Denies abdominal pain.  No indwelling catheters.    REVIEW OF SYSTEMS  See HPI for further details.  Limited secondary to the patient's altered mental status.     PAST MEDICAL HISTORY   has a past medical history of BPH (benign prostatic hyperplasia); Constipation; Dementia; DVT (deep venous thrombosis) ([facility]); Failure to thrive (0-17); High cholesterol; Seizure disorder ([facility]); and TBI (traumatic brain injury) ([facility]).    SOCIAL HISTORY  Social History     Social History Main Topics   • Smoking status: Never Smoker   • Smokeless tobacco: Never Used   • Alcohol use No   • Drug use: No   • Sexual activity: Not on file       SURGICAL HISTORY   has a past surgical history that includes hip hemiarthroplasty (Left, 2/20/2018).    CURRENT MEDICATIONS  Home Medications    **Home medications have not yet been reviewed for this encounter**         ALLERGIES  Allergies   Allergen Reactions   • Depakote [Valproic Acid]    • Heparin    • Phenytoin        PHYSICAL EXAM  VITAL SIGNS: /96   Pulse (!) 116   Temp 36 °C  "(96.8 °F)   Resp 18   Ht 1.727 m (5' 8\")   Wt 80 kg (176 lb 5.9 oz)   BMI 26.82 kg/m²   Pulse ox interpretation: AsI interpret this pulse ox as normal.  Constitutional: Alert in no apparent distress.  HENT: No signs of trauma, Bilateral external ears normal, Nose normal.  Dry mucous membranes.  Eyes: Pupils are equal and reactive, Conjunctiva normal, Non-icteric.   Neck: Normal range of motion, No tenderness, Supple, No stridor.   Cardiovascular: Tachycardic rate and regular rhythm, no murmurs.   Thorax & Lungs: Normal breath sounds, No respiratory distress, No wheezing, No chest tenderness.   Abdomen: Bowel sounds normal, Soft, No tenderness, No masses, No pulsatile masses. No peritoneal signs.  Skin: Warm, Dry, No erythema, No rash.   Back: No bony tenderness, No CVA tenderness.   Extremities: Intact distal pulses, No edema, No tenderness, No cyanosis   Neurologic: Alert, oriented to person and place however not time, normal  strength bilaterally, no obvious facial droop      DIAGNOSTIC STUDIES / PROCEDURES      LABS  Labs Reviewed   LACTIC ACID - Abnormal; Notable for the following:        Result Value    Lactic Acid 4.2 (*)     All other components within normal limits   CBC WITH DIFFERENTIAL - Abnormal; Notable for the following:     MCHC 33.3 (*)     All other components within normal limits   COMP METABOLIC PANEL - Abnormal; Notable for the following:     Sodium 131 (*)     Alkaline Phosphatase 120 (*)     All other components within normal limits   URINALYSIS - Abnormal; Notable for the following:     Occult Blood Trace (*)     All other components within normal limits    Narrative:     Indication for culture:->Emergency Room Patient   URINE MICROSCOPIC (W/UA) - Abnormal; Notable for the following:     WBC 0-2 (*)     RBC 2-5 (*)     All other components within normal limits    Narrative:     Indication for culture:->Emergency Room Patient   LACTIC ACID   URINE CULTURE(NEW)    Narrative:     " "Indication for culture:->Emergency Room Patient   BLOOD CULTURE    Narrative:     Per Hospital Policy: Only change Specimen Src: to \"Line\" if  specified by physician order.   BLOOD CULTURE    Narrative:     Per Hospital Policy: Only change Specimen Src: to \"Line\" if  specified by physician order.   ESTIMATED GFR       RADIOLOGY  DX-CHEST-PORTABLE (1 VIEW)   Final Result      No acute cardiac or pulmonary abnormalities are identified. Lung volumes are low.          COURSE & MEDICAL DECISION MAKING  Pertinent Labs & Imaging studies reviewed. (See chart for details)  62 y.o. male with a history of encephalopathy and traumatic brain injury and seizure, on Keppra, presenting with seizure activity at a rehab facility where he resides.    There was concern because he did not appear to be back at his baseline.  Normal glucose.  Patient in no distress here.   speaking here and is oriented to person and place.  No obvious signs of trauma.  Did have slight tachycardia upon arrival with lactic acidosis.  Clear breath sounds bilaterally.  No fever.  No obvious signs of serious bacterial illness however.  No leukocytosis.  No evidence of pneumonia on chest x-ray.  Urinalysis is not consistent with urinary tract infection.  No abdominal tenderness.    Patient was given IV fluid hydration given his high lactic acid and tachycardia upon arrival and in conjunction with dry mucous membranes.  Concern for possible dehydration.  I do suspect that the elevation in lactic acid is secondary to seizure activity.  No obvious metabolic acidosis.  Heart rate did improve with IV fluid hydration.  Lactic acid resolved to a normal level after IV fluid hydration as well.    Patient is stable here.  No obvious signs of trauma to the head.  No reports of trauma.  Patient is speaking clearly here.  Appears stable for discharge and return to facility for further care.  To follow-up with primary care physician for further management.      The patient " "will return for worsening symptoms or failure of improvement and is stable at the time of discharge. The patient verbalizes understanding in their own words.    /96   Pulse 100   Temp 36 °C (96.8 °F)   Resp 18   Ht 1.727 m (5' 8\")   Wt 80 kg (176 lb 5.9 oz)   SpO2 97%   BMI 26.82 kg/m²     The patient was referred to primary care where they will receive further BP management.      Pcp Not In Computer    In 2 days      [facility], Emergency Dept  [address] [place] [id number] [phone]    As needed, If symptoms worsen      FINAL IMPRESSION  1. Seizure (HCC)            Electronically signed by: Austyn Hardwick, 5/20/2018 2:00 AM    "

## 2018-05-20 NOTE — ED TRIAGE NOTES
Chief Complaint   Patient presents with   • Seizure     From [facility] for speech rehab s/p stroke. Witnessed sz by staff approx 0030. Hx sz's.      Pt BIB [facility] for above.

## 2018-05-20 NOTE — ED NOTES
Jannie from Lab called with critical result of Lactic  at 4.2. Critical lab result read back to Jannie.   Dr. Hardwick notified of critical lab result at 0238.  Critical lab result read back by Dr. Hardwick.

## 2018-05-20 NOTE — DISCHARGE INSTRUCTIONS
Seizure, Adult    A seizure is a sudden burst of abnormal electrical activity in the brain. The abnormal activity temporarily interrupts normal brain function, causing a person to experience any of the following:  · Involuntary movements.  · Changes in awareness or consciousness.  · Uncontrollable shaking (convulsions).  Seizures usually last from 30 seconds to 2 minutes. They usually do not cause permanent brain damage unless they are prolonged.  What can cause a seizure to happen?  Seizures can happen for many reasons including:  · A fever.  · Low blood sugar.  · A medicine.  · An illnesses.  · A brain injury.  Some people who have a seizure never have another one. People who have repeated seizures have a condition called epilepsy.  What are the symptoms of a seizure?  Symptoms of a seizure vary greatly from person to person. They include:  · Convulsions.  · Stiffening of the body.  · Involuntary movements of the arms or legs.  · Loss of consciousness.  · Breathing problems.  · Falling suddenly.  · Confusion.  · Head nodding.  · Eye blinking or fluttering.  · Lip smacking.  · Drooling.  · Rapid eye movements.  · Grunting.  · Loss of bladder control and bowel control.  · Staring.  · Unresponsiveness.  Some people have symptoms right before a seizure happens (aura) and right after a seizure happens. Symptoms of an aura include:  · Fear or anxiety.  · Nausea.  · Feeling like the room is spinning (vertigo).  · A feeling of having seen or heard something before (janak vu).  · Odd tastes or smells.  · Changes in vision, such as seeing flashing lights or spots.  Symptoms that may follow a seizure include:  · Confusion.  · Sleepiness.  · Headache.  · Weakness of one side of the body.  Follow these instructions at home:    Medicines  · Take over-the-counter and prescription medicines only as told by your health care provider.  · Avoid any substances that may prevent your medicine from working properly, such as  alcohol.  Activity  · Do not drive, swim, or do any other activities that would be dangerous if you had another seizure. Wait until your health care provider approves.  · If you live in the [place] check with your local DMV (department of motor vehicles) to find out about the local driving laws. Each state has specific rules about when you can legally return to driving.  · Get enough rest. Lack of sleep can make seizures more likely to occur.  Educating others  Teach friends and family what to do if you have a seizure. They should:  · Lay you on the ground to prevent a fall.  · Cushion your head and body.  · Loosen any tight clothing around your neck.  · Turn you on your side. If vomiting occurs, this helps keep your airway clear.  · Stay with you until you recover.  · Not hold you down. Holding you down will not stop the seizure.  · Not put anything in your mouth.  · Know whether or not you need emergency care.  General instructions  · Contact your health care provider each time you have a seizure.  · Avoid anything that has ever triggered a seizure for you.  · Keep a seizure diary. Record what you remember about each seizure, especially anything that might have triggered the seizure.  · Keep all follow-up visits as told by your health care provider. This is important.  Contact a health care provider if:  · You have another seizure.  · You have seizures more often.  · Your seizure symptoms change.  · You continue to have seizures with treatment.  · You have symptoms of an infection or illness. They might increase your risk of having a seizure.  Get help right away if:  · You have a seizure:  ? That lasts longer than 5 minutes.  ? That is different than previous seizures.  ? That leaves you unable to speak or use a part of your body.  ? That makes it harder to breathe.  ? After a head injury.  · You have:  ? Multiple seizures in a row.  ? Confusion or a severe headache right after a seizure.  · You are having seizures  more often.  · You do not wake up immediately after a seizure.  · You injure yourself during a seizure.  These symptoms may represent a serious problem that is an emergency. Do not wait to see if the symptoms will go away. Get medical help right away. Call your local emergency services (911 in the [place] Do not drive yourself to the hospital.   This information is not intended to replace advice given to you by your health care provider. Make sure you discuss any questions you have with your health care provider.  Document Released: 12/15/2001 Document Revised: 08/13/2017 Document Reviewed: 07/21/2017  Elsevier Interactive Patient Education © 2017 [employer]

## 2018-05-20 NOTE — ED NOTES
Bita fall assessment completed. Pt is High risk for fall. Interventions complete. Pt placed in yellow non slip socks, wrist band placed, green sign on door. Bed locked in low position, call light in place. Personal possessions in place.  Personal needs assessed. Charge nurse notified to move pt to a more visible room if available. Safety assessed. Will monitor frequently

## 2018-05-20 NOTE — ED NOTES
Alerted SW that pt has discharge orders as his lactate is normalized and he is back to his baseline mental status.  She will alert [facility] abd set up [facility] transport.

## 2018-05-20 NOTE — DISCHARGE PLANNING
Medical Social Work     MICHOACANO received a call from the bedside RN requesting assistance setting up transportation back to [facility]. MICHOACANO called [facility] and advised them the pt is medically clear to discharge and SW is setting up transport.     MICHOACANO faxed a PCS form to [facility] and set up transportation for 0600.     The transfer packet and cobra are complete and placed with the hard chart.     Plan: MICHOACANO will remain available for pt and family support.

## 2018-05-22 LAB
BACTERIA UR CULT: NORMAL
SIGNIFICANT IND 70042: NORMAL
SITE SITE: NORMAL
SOURCE SOURCE: NORMAL

## 2018-05-25 LAB
BACTERIA BLD CULT: NORMAL
BACTERIA BLD CULT: NORMAL
SIGNIFICANT IND 70042: NORMAL
SIGNIFICANT IND 70042: NORMAL
SITE SITE: NORMAL
SITE SITE: NORMAL
SOURCE SOURCE: NORMAL
SOURCE SOURCE: NORMAL

## 2020-11-20 ENCOUNTER — HOSPITAL ENCOUNTER (OUTPATIENT)
Facility: MEDICAL CENTER | Age: 65
End: 2020-11-20
Payer: COMMERCIAL

## 2020-11-21 LAB
COVID ORDER STATUS COVID19: NORMAL
SARS-COV-2 RNA RESP QL NAA+PROBE: NOTDETECTED
SPECIMEN SOURCE: NORMAL

## 2020-11-22 ENCOUNTER — HOSPITAL ENCOUNTER (OUTPATIENT)
Facility: MEDICAL CENTER | Age: 65
End: 2020-11-22
Payer: COMMERCIAL

## 2020-11-22 LAB
COVID ORDER STATUS COVID19: NORMAL
SARS-COV-2 RNA RESP QL NAA+PROBE: NOTDETECTED
SPECIMEN SOURCE: NORMAL

## (undated) DEVICE — GLOVE BIOGEL PI ORTHO SZ 7 PF LF (40PR/BX)

## (undated) DEVICE — KIT HIP PREP IM ENCHANCE TOTAL (5EA/BX)

## (undated) DEVICE — PACK TOTAL HIP - (1/CA)

## (undated) DEVICE — GLOVE BIOGEL PI INDICATOR SZ 7.5 SURGICAL PF LF -(50/BX 4BX/CA)

## (undated) DEVICE — MASK ANESTHESIA ADULT  - (100/CA)

## (undated) DEVICE — BRUSH FMCNL TIP IRR STRL - (12/PKG) FOR SURGILAV

## (undated) DEVICE — DRESSING POST OP BORDER 4 X 10 (5EA/BX)

## (undated) DEVICE — GLOVE BIOGEL INDICATOR SZ 8 SURGICAL PF LTX - (50/BX 4BX/CA)

## (undated) DEVICE — LENS/HOOD FOR SPACESUIT - (32/PK) PEEL AWAY FACE

## (undated) DEVICE — HEAD HOLDER JUNIOR/ADULT

## (undated) DEVICE — GLOVE BIOGEL INDICATOR SZ 7.5 SURGICAL PF LTX - (50PR/BX 4BX/CA)

## (undated) DEVICE — SODIUM CHL IRRIGATION 0.9% 1000ML (12EA/CA)

## (undated) DEVICE — ELECTRODE DUAL RETURN W/ CORD - (50/PK)

## (undated) DEVICE — SET LEADWIRE 5 LEAD BEDSIDE DISPOSABLE ECG (1SET OF 5/EA)

## (undated) DEVICE — GLOVE SZ 7.5 BIOGEL PI MICRO - PF LF (50PR/BX)

## (undated) DEVICE — SODIUM CHL. IRRIGATION 0.9% 3000ML (4EA/CA 65CA/PF)

## (undated) DEVICE — GLOVE BIOGEL INDICATOR SZ 6.5 SURGICAL PF LTX - (50PR/BX 4BX/CA)

## (undated) DEVICE — TIP INTPLS HFLO ML ORFC BTRY - (12/CS)  FOR SURGILAV

## (undated) DEVICE — GOWN SURGEONS X-LARGE - DISP. (30/CA)

## (undated) DEVICE — SUTURE GENERAL

## (undated) DEVICE — ELECTRODE 850 FOAM ADHESIVE - HYDROGEL RADIOTRNSPRNT (50/PK)

## (undated) DEVICE — GLOVE BIOGEL SZ 6.5 SURGICAL PF LTX (50PR/BX 4BX/CA)

## (undated) DEVICE — SET EXTENSION WITH 2 PORTS (48EA/CA) ***PART #2C8610 IS A SUBSTITUTE*****

## (undated) DEVICE — DRAPE SURG STERI-DRAPE 7X11OD - (40EA/CA)

## (undated) DEVICE — HANDPIECE 10FT INTPLS SCT PLS IRRIGATION HAND CONTROL SET (6/PK)

## (undated) DEVICE — BLADE SAGITTAL SAW DUAL CUT 25.0 X 90.0 X 1.27MM (1/EA)

## (undated) DEVICE — MIXER BONE CEMENT REVOLUTION - W/FEMORAL PRESSURIZER (6/CA)

## (undated) DEVICE — PROTECTOR ULNA NERVE - (36PR/CA)

## (undated) DEVICE — KIT ANESTHESIA W/CIRCUIT & 3/LT BAG W/FILTER (20EA/CA)

## (undated) DEVICE — KIT ROOM DECONTAMINATION

## (undated) DEVICE — MASK, LARYNGEAL AIRWAY #4

## (undated) DEVICE — Device

## (undated) DEVICE — GOWN WARMING STANDARD FLEX - (30/CA)

## (undated) DEVICE — NEPTUNE 4 PORT MANIFOLD - (20/PK)

## (undated) DEVICE — SUTURE 5 ETHIBOND V-37 (12PK/BX)

## (undated) DEVICE — SUCTION INSTRUMENT YANKAUER BULBOUS TIP W/O VENT (50EA/CA)

## (undated) DEVICE — CANISTER SUCTION 3000ML MECHANICAL FILTER AUTO SHUTOFF MEDI-VAC NONSTERILE LF DISP  (40EA/CA)

## (undated) DEVICE — CHLORAPREP 26 ML APPLICATOR - ORANGE TINT(25/CA)

## (undated) DEVICE — SUTURE 0 VICRYL PLUS CTX - 36 INCH (36/BX)

## (undated) DEVICE — SLEEVE, VASO, THIGH, MED

## (undated) DEVICE — SUTURE 2-0 VICRYL PLUS CTX - 36 INCH (36/BX)

## (undated) DEVICE — DRAPE STRLE REG TOWEL 18X24 - (10/BX 4BX/CA)"

## (undated) DEVICE — GLOVE BIOGEL SZ 8 SURGICAL PF LTX - (50PR/BX 4BX/CA)

## (undated) DEVICE — LACTATED RINGERS INJ 1000 ML - (14EA/CA 60CA/PF)

## (undated) DEVICE — SENSOR SPO2 NEO LNCS ADHESIVE (20/BX) SEE USER NOTES

## (undated) DEVICE — TUBING CLEARLINK DUO-VENT - C-FLO (48EA/CA)